# Patient Record
Sex: MALE | Race: WHITE
[De-identification: names, ages, dates, MRNs, and addresses within clinical notes are randomized per-mention and may not be internally consistent; named-entity substitution may affect disease eponyms.]

---

## 2018-02-16 ENCOUNTER — HOSPITAL ENCOUNTER (INPATIENT)
Dept: HOSPITAL 53 - M MSPAV | Age: 48
LOS: 8 days | Discharge: HOME | DRG: 193 | End: 2018-02-24
Attending: INTERNAL MEDICINE | Admitting: INTERNAL MEDICINE
Payer: COMMERCIAL

## 2018-02-16 DIAGNOSIS — Z79.82: ICD-10-CM

## 2018-02-16 DIAGNOSIS — J44.0: ICD-10-CM

## 2018-02-16 DIAGNOSIS — J44.1: ICD-10-CM

## 2018-02-16 DIAGNOSIS — J10.1: Primary | ICD-10-CM

## 2018-02-16 DIAGNOSIS — Z79.899: ICD-10-CM

## 2018-02-16 DIAGNOSIS — J96.01: ICD-10-CM

## 2018-02-16 DIAGNOSIS — F17.210: ICD-10-CM

## 2018-02-16 LAB
ABG BASE EXCESS: -0.7 (ref -2–2)
ABG BASE EXCESS: 0.5 (ref -2–2)
ABG HCO3: 24.6 MEQ/L (ref 22–26)
ABG HCO3: 25 MEQ/L (ref 22–26)
ABG O2 SATURATION: 92 % (ref 95–99)
ABG O2 SATURATION: 98.6 % (ref 95–99)
ABG PARTIAL PRESSURE CO2: 38.2 MMHG (ref 35–45)
ABG PARTIAL PRESSURE CO2: 44.9 MMHG (ref 35–45)
ABG PARTIAL PRESSURE O2: 130.5 MMHG (ref 75–100)
ABG PARTIAL PRESSURE O2: 66.1 MMHG (ref 75–100)
ABG PH (ARTERIAL): 7.36 UNITS (ref 7.35–7.45)
ABG PH (ARTERIAL): 7.43 UNITS (ref 7.35–7.45)
ABG STANDARD HCO3: 23.7 MEQ/L (ref 22–26)
ABG STANDARD HCO3: 24.9 MEQ/L (ref 22–26)
ABG TOTAL CO2: 25.7 MEQ/L (ref 22–29)
ABG TOTAL CO2: 26.4 MEQ/L (ref 22–29)
ALBUMIN/GLOBULIN RATIO: 0.86 (ref 1–1.93)
ALBUMIN: 3.6 GM/DL (ref 3.2–5.2)
ALKALINE PHOSPHATASE: 85 U/L (ref 45–117)
ALT SERPL W P-5'-P-CCNC: 19 U/L (ref 12–78)
ANION GAP: 7 MEQ/L (ref 8–16)
AST SERPL-CCNC: 13 U/L (ref 7–37)
BASO #: 0.1 10^3/UL (ref 0–0.2)
BASO %: 0.4 % (ref 0–1)
BILIRUB CONJ SERPL-MCNC: 0.2 MG/DL (ref 0–0.2)
BILIRUBIN,TOTAL: 0.5 MG/DL (ref 0.2–1)
BLOOD UREA NITROGEN: 10 MG/DL (ref 7–18)
CALCIUM LEVEL: 9 MG/DL (ref 8.5–10.1)
CARBON DIOXIDE LEVEL: 28 MEQ/L (ref 21–32)
CHLORIDE LEVEL: 102 MEQ/L (ref 98–107)
CK MB CFR.DF SERPL CALC: 2.87
CK MB CFR.DF SERPL CALC: 2.98
CK SERPL-CCNC: 66 U/L (ref 39–308)
CK SERPL-CCNC: 67 U/L (ref 39–308)
CK-MB VALUE MASS: 1.9 NG/ML (ref 0–3.6)
CK-MB VALUE MASS: 2 NG/ML (ref 0–3.6)
CREATININE FOR GFR: 0.81 MG/DL (ref 0.7–1.3)
EOS #: 0 10^3/UL (ref 0–0.5)
EOSINOPHIL NFR BLD AUTO: 0 % (ref 0–3)
GFR SERPL CREATININE-BSD FRML MDRD: > 60 ML/MIN/{1.73_M2} (ref 60–?)
GLUCOSE, FASTING: 138 MG/DL (ref 70–100)
HEMATOCRIT: 51.8 % (ref 42–52)
HEMOGLOBIN: 17.8 G/DL (ref 14–18)
IMMATURE GRANULOCYTE %: 0.4 % (ref 0–3)
LACTIC ACID SEPSIS PROTOCOL: 1.2 MMOL/L (ref 0.4–2)
LYMPH #: 1.2 10^3/UL (ref 1.5–4.5)
LYMPH %: 5.4 % (ref 24–44)
MAGNESIUM LEVEL: 2.2 MG/DL (ref 1.8–2.4)
MEAN CORPUSCULAR HEMOGLOBIN: 31.7 PG (ref 27–33)
MEAN CORPUSCULAR HGB CONC: 34.4 G/DL (ref 32–36.5)
MEAN CORPUSCULAR VOLUME: 92.3 FL (ref 80–96)
MONO #: 2.1 10^3/UL (ref 0–0.8)
MONO %: 9.4 % (ref 0–5)
NEUTROPHILS #: 18.9 10^3/UL (ref 1.8–7.7)
NEUTROPHILS %: 84.4 % (ref 36–66)
NRBC BLD AUTO-RTO: 0 % (ref 0–0)
NT-PRO BNP: 439 PG/ML (ref ?–125)
PLATELET COUNT, AUTOMATED: 315 10^3/UL (ref 150–450)
POSITIVE DIFF: (no result)
POTASSIUM SERUM: 4.5 MEQ/L (ref 3.5–5.1)
RED BLOOD COUNT: 5.61 10^6/UL (ref 4.3–6.1)
RED CELL DISTRIBUTION WIDTH: 13.1 % (ref 11.5–14.5)
SODIUM LEVEL: 137 MEQ/L (ref 136–145)
TOTAL PROTEIN: 7.8 GM/DL (ref 6.4–8.2)
TROPONIN I: < 0.02 NG/ML (ref ?–0.1)
TROPONIN I: < 0.02 NG/ML (ref ?–0.1)
WHITE BLOOD COUNT: 22.4 10^3/UL (ref 4–10)

## 2018-02-16 RX ADMIN — SODIUM CHLORIDE 1 MLS/HR: 9 INJECTION, SOLUTION INTRAVENOUS at 15:08

## 2018-02-16 RX ADMIN — SODIUM CHLORIDE 1 MLS/HR: 9 INJECTION, SOLUTION INTRAVENOUS at 23:53

## 2018-02-16 RX ADMIN — IPRATROPIUM BROMIDE AND ALBUTEROL SULFATE 1 ML: .5; 3 SOLUTION RESPIRATORY (INHALATION) at 20:02

## 2018-02-16 RX ADMIN — METHYLPREDNISOLONE SODIUM SUCCINATE 1 MG: 125 INJECTION, POWDER, FOR SOLUTION INTRAMUSCULAR; INTRAVENOUS at 06:00

## 2018-02-16 RX ADMIN — DEXTROSE MONOHYDRATE 1 MG: 50 INJECTION, SOLUTION INTRAVENOUS at 09:16

## 2018-02-16 RX ADMIN — ENOXAPARIN SODIUM 1 MG: 40 INJECTION SUBCUTANEOUS at 10:36

## 2018-02-16 RX ADMIN — CEFTRIAXONE SODIUM 1 MLS/HR: 100 INJECTION, POWDER, FOR SOLUTION INTRAVENOUS at 06:30

## 2018-02-16 RX ADMIN — METHYLPREDNISOLONE SODIUM SUCCINATE 1 MG: 125 INJECTION, POWDER, FOR SOLUTION INTRAMUSCULAR; INTRAVENOUS at 23:51

## 2018-02-16 RX ADMIN — SODIUM CHLORIDE 1 MLS/HR: 9 INJECTION, SOLUTION INTRAVENOUS at 09:56

## 2018-02-16 RX ADMIN — DEXTROSE MONOHYDRATE 1 MLS/HR: 50 INJECTION, SOLUTION INTRAVENOUS at 09:16

## 2018-02-16 RX ADMIN — LORAZEPAM 1 MG: 2 INJECTION INTRAMUSCULAR; INTRAVENOUS at 07:18

## 2018-02-16 RX ADMIN — ALBUTEROL SULFATE 1 MG: 2.5 SOLUTION RESPIRATORY (INHALATION) at 05:57

## 2018-02-16 RX ADMIN — NICOTINE 1 PATCH: 21 PATCH, EXTENDED RELEASE TRANSDERMAL at 09:00

## 2018-02-16 RX ADMIN — METHYLPREDNISOLONE SODIUM SUCCINATE 1 MG: 125 INJECTION, POWDER, FOR SOLUTION INTRAMUSCULAR; INTRAVENOUS at 12:17

## 2018-02-16 RX ADMIN — METHYLPREDNISOLONE SODIUM SUCCINATE 1 MG: 125 INJECTION, POWDER, FOR SOLUTION INTRAMUSCULAR; INTRAVENOUS at 18:02

## 2018-02-16 RX ADMIN — IPRATROPIUM BROMIDE AND ALBUTEROL SULFATE 1 ML: .5; 3 SOLUTION RESPIRATORY (INHALATION) at 08:00

## 2018-02-16 RX ADMIN — SODIUM CHLORIDE 1 MLS/HR: 9 INJECTION, SOLUTION INTRAVENOUS at 07:45

## 2018-02-17 LAB
ANION GAP: 6 MEQ/L (ref 8–16)
BLOOD UREA NITROGEN: 12 MG/DL (ref 7–18)
CALCIUM LEVEL: 8.6 MG/DL (ref 8.5–10.1)
CARBON DIOXIDE LEVEL: 30 MEQ/L (ref 21–32)
CHLORIDE LEVEL: 105 MEQ/L (ref 98–107)
CREATININE FOR GFR: 0.69 MG/DL (ref 0.7–1.3)
GFR SERPL CREATININE-BSD FRML MDRD: > 60 ML/MIN/{1.73_M2} (ref 60–?)
GLUCOSE, FASTING: 161 MG/DL (ref 70–100)
HEMATOCRIT: 46.3 % (ref 42–52)
HEMOGLOBIN: 15.8 G/DL (ref 14–18)
MEAN CORPUSCULAR HEMOGLOBIN: 31.6 PG (ref 27–33)
MEAN CORPUSCULAR HGB CONC: 34.1 G/DL (ref 32–36.5)
MEAN CORPUSCULAR VOLUME: 92.6 FL (ref 80–96)
NRBC BLD AUTO-RTO: 0 % (ref 0–0)
PLATELET COUNT, AUTOMATED: 317 10^3/UL (ref 150–450)
POTASSIUM SERUM: 4.7 MEQ/L (ref 3.5–5.1)
RED BLOOD COUNT: 5 10^6/UL (ref 4.3–6.1)
RED CELL DISTRIBUTION WIDTH: 13.3 % (ref 11.5–14.5)
SODIUM LEVEL: 141 MEQ/L (ref 136–145)
WHITE BLOOD COUNT: 17.4 10^3/UL (ref 4–10)

## 2018-02-17 RX ADMIN — METHYLPREDNISOLONE SODIUM SUCCINATE 1 MG: 125 INJECTION, POWDER, FOR SOLUTION INTRAMUSCULAR; INTRAVENOUS at 17:44

## 2018-02-17 RX ADMIN — DEXTROSE MONOHYDRATE 1 MLS/HR: 50 INJECTION, SOLUTION INTRAVENOUS at 07:36

## 2018-02-17 RX ADMIN — SODIUM CHLORIDE 1 MLS/HR: 9 INJECTION, SOLUTION INTRAVENOUS at 10:44

## 2018-02-17 RX ADMIN — NICOTINE 1 PATCH: 21 PATCH, EXTENDED RELEASE TRANSDERMAL at 09:00

## 2018-02-17 RX ADMIN — ENOXAPARIN SODIUM 1 MG: 40 INJECTION SUBCUTANEOUS at 08:40

## 2018-02-17 RX ADMIN — CEFTRIAXONE SODIUM 1 MLS/HR: 100 INJECTION, POWDER, FOR SOLUTION INTRAVENOUS at 06:12

## 2018-02-17 RX ADMIN — IPRATROPIUM BROMIDE AND ALBUTEROL SULFATE 1 ML: .5; 3 SOLUTION RESPIRATORY (INHALATION) at 20:00

## 2018-02-17 RX ADMIN — METHYLPREDNISOLONE SODIUM SUCCINATE 1 MG: 125 INJECTION, POWDER, FOR SOLUTION INTRAMUSCULAR; INTRAVENOUS at 06:12

## 2018-02-17 RX ADMIN — DEXTROSE MONOHYDRATE 1 MG: 50 INJECTION, SOLUTION INTRAVENOUS at 08:40

## 2018-02-17 RX ADMIN — IPRATROPIUM BROMIDE AND ALBUTEROL SULFATE 1 ML: .5; 3 SOLUTION RESPIRATORY (INHALATION) at 11:45

## 2018-02-17 RX ADMIN — METHYLPREDNISOLONE SODIUM SUCCINATE 1 MG: 125 INJECTION, POWDER, FOR SOLUTION INTRAMUSCULAR; INTRAVENOUS at 11:35

## 2018-02-17 RX ADMIN — IPRATROPIUM BROMIDE AND ALBUTEROL SULFATE 1 ML: .5; 3 SOLUTION RESPIRATORY (INHALATION) at 15:17

## 2018-02-17 RX ADMIN — IPRATROPIUM BROMIDE AND ALBUTEROL SULFATE 1 ML: .5; 3 SOLUTION RESPIRATORY (INHALATION) at 08:49

## 2018-02-18 LAB
ANION GAP: 3 MEQ/L (ref 8–16)
BLOOD UREA NITROGEN: 13 MG/DL (ref 7–18)
CALCIUM LEVEL: 8.5 MG/DL (ref 8.5–10.1)
CARBON DIOXIDE LEVEL: 33 MEQ/L (ref 21–32)
CHLORIDE LEVEL: 104 MEQ/L (ref 98–107)
CREATININE FOR GFR: 0.7 MG/DL (ref 0.7–1.3)
GFR SERPL CREATININE-BSD FRML MDRD: > 60 ML/MIN/{1.73_M2} (ref 60–?)
GLUCOSE, FASTING: 136 MG/DL (ref 70–100)
HEMATOCRIT: 45.1 % (ref 42–52)
HEMOGLOBIN: 14.9 G/DL (ref 14–18)
MEAN CORPUSCULAR HEMOGLOBIN: 31.5 PG (ref 27–33)
MEAN CORPUSCULAR HGB CONC: 33 G/DL (ref 32–36.5)
MEAN CORPUSCULAR VOLUME: 95.3 FL (ref 80–96)
NRBC BLD AUTO-RTO: 0 % (ref 0–0)
PLATELET COUNT, AUTOMATED: 342 10^3/UL (ref 150–450)
POTASSIUM SERUM: 4.4 MEQ/L (ref 3.5–5.1)
RED BLOOD COUNT: 4.73 10^6/UL (ref 4.3–6.1)
RED CELL DISTRIBUTION WIDTH: 13.2 % (ref 11.5–14.5)
SODIUM LEVEL: 140 MEQ/L (ref 136–145)
WHITE BLOOD COUNT: 24.7 10^3/UL (ref 4–10)

## 2018-02-18 RX ADMIN — DEXTROSE MONOHYDRATE 1 MLS/HR: 50 INJECTION, SOLUTION INTRAVENOUS at 07:55

## 2018-02-18 RX ADMIN — ENOXAPARIN SODIUM 1 MG: 40 INJECTION SUBCUTANEOUS at 07:55

## 2018-02-18 RX ADMIN — METHYLPREDNISOLONE SODIUM SUCCINATE 1 MG: 125 INJECTION, POWDER, FOR SOLUTION INTRAMUSCULAR; INTRAVENOUS at 12:10

## 2018-02-18 RX ADMIN — METHYLPREDNISOLONE SODIUM SUCCINATE 1 MG: 125 INJECTION, POWDER, FOR SOLUTION INTRAMUSCULAR; INTRAVENOUS at 17:31

## 2018-02-18 RX ADMIN — METHYLPREDNISOLONE SODIUM SUCCINATE 1 MG: 125 INJECTION, POWDER, FOR SOLUTION INTRAMUSCULAR; INTRAVENOUS at 06:37

## 2018-02-18 RX ADMIN — NICOTINE 1 PATCH: 21 PATCH, EXTENDED RELEASE TRANSDERMAL at 07:56

## 2018-02-18 RX ADMIN — IPRATROPIUM BROMIDE AND ALBUTEROL SULFATE 1 ML: .5; 3 SOLUTION RESPIRATORY (INHALATION) at 08:07

## 2018-02-18 RX ADMIN — IPRATROPIUM BROMIDE AND ALBUTEROL SULFATE 1 ML: .5; 3 SOLUTION RESPIRATORY (INHALATION) at 20:08

## 2018-02-18 RX ADMIN — CEFTRIAXONE SODIUM 1 MLS/HR: 100 INJECTION, POWDER, FOR SOLUTION INTRAVENOUS at 06:37

## 2018-02-18 RX ADMIN — METHYLPREDNISOLONE SODIUM SUCCINATE 1 MG: 125 INJECTION, POWDER, FOR SOLUTION INTRAMUSCULAR; INTRAVENOUS at 23:53

## 2018-02-18 RX ADMIN — METHYLPREDNISOLONE SODIUM SUCCINATE 1 MG: 125 INJECTION, POWDER, FOR SOLUTION INTRAMUSCULAR; INTRAVENOUS at 00:20

## 2018-02-18 RX ADMIN — IPRATROPIUM BROMIDE AND ALBUTEROL SULFATE 1 ML: .5; 3 SOLUTION RESPIRATORY (INHALATION) at 12:02

## 2018-02-19 LAB
ANION GAP: 5 MEQ/L (ref 8–16)
BLOOD UREA NITROGEN: 17 MG/DL (ref 7–18)
CALCIUM LEVEL: 8.5 MG/DL (ref 8.5–10.1)
CARBON DIOXIDE LEVEL: 34 MEQ/L (ref 21–32)
CHLORIDE LEVEL: 104 MEQ/L (ref 98–107)
CREATININE FOR GFR: 0.74 MG/DL (ref 0.7–1.3)
GFR SERPL CREATININE-BSD FRML MDRD: > 60 ML/MIN/{1.73_M2} (ref 60–?)
GLUCOSE, FASTING: 133 MG/DL (ref 70–100)
HEMATOCRIT: 43.5 % (ref 42–52)
HEMOGLOBIN: 14.5 G/DL (ref 14–18)
MEAN CORPUSCULAR HEMOGLOBIN: 31.8 PG (ref 27–33)
MEAN CORPUSCULAR HGB CONC: 33.3 G/DL (ref 32–36.5)
MEAN CORPUSCULAR VOLUME: 95.4 FL (ref 80–96)
NRBC BLD AUTO-RTO: 0 % (ref 0–0)
PLATELET COUNT, AUTOMATED: 341 10^3/UL (ref 150–450)
POTASSIUM SERUM: 4.1 MEQ/L (ref 3.5–5.1)
RED BLOOD COUNT: 4.56 10^6/UL (ref 4.3–6.1)
RED CELL DISTRIBUTION WIDTH: 13.2 % (ref 11.5–14.5)
SODIUM LEVEL: 143 MEQ/L (ref 136–145)
WHITE BLOOD COUNT: 20 10^3/UL (ref 4–10)

## 2018-02-19 RX ADMIN — IPRATROPIUM BROMIDE AND ALBUTEROL SULFATE 1 ML: .5; 3 SOLUTION RESPIRATORY (INHALATION) at 07:24

## 2018-02-19 RX ADMIN — ENOXAPARIN SODIUM 1 MG: 40 INJECTION SUBCUTANEOUS at 08:42

## 2018-02-19 RX ADMIN — CEFTRIAXONE SODIUM 1 MLS/HR: 100 INJECTION, POWDER, FOR SOLUTION INTRAVENOUS at 06:06

## 2018-02-19 RX ADMIN — IPRATROPIUM BROMIDE AND ALBUTEROL SULFATE 1 ML: .5; 3 SOLUTION RESPIRATORY (INHALATION) at 20:59

## 2018-02-19 RX ADMIN — METHYLPREDNISOLONE SODIUM SUCCINATE 1 MG: 125 INJECTION, POWDER, FOR SOLUTION INTRAMUSCULAR; INTRAVENOUS at 06:06

## 2018-02-19 RX ADMIN — METHYLPREDNISOLONE SODIUM SUCCINATE 1 MG: 125 INJECTION, POWDER, FOR SOLUTION INTRAMUSCULAR; INTRAVENOUS at 12:52

## 2018-02-19 RX ADMIN — IPRATROPIUM BROMIDE AND ALBUTEROL SULFATE 1 ML: .5; 3 SOLUTION RESPIRATORY (INHALATION) at 15:46

## 2018-02-19 RX ADMIN — METHYLPREDNISOLONE SODIUM SUCCINATE 1 MG: 125 INJECTION, POWDER, FOR SOLUTION INTRAMUSCULAR; INTRAVENOUS at 17:12

## 2018-02-19 RX ADMIN — IPRATROPIUM BROMIDE AND ALBUTEROL SULFATE 1 ML: .5; 3 SOLUTION RESPIRATORY (INHALATION) at 11:20

## 2018-02-19 RX ADMIN — NICOTINE 1 PATCH: 21 PATCH, EXTENDED RELEASE TRANSDERMAL at 08:42

## 2018-02-20 LAB
A1AT SERPL-MCNC: 109 MG/DL (ref 90–200)
ANION GAP: 4 MEQ/L (ref 8–16)
BLOOD UREA NITROGEN: 16 MG/DL (ref 7–18)
CALCIUM LEVEL: 8.9 MG/DL (ref 8.5–10.1)
CARBON DIOXIDE LEVEL: 35 MEQ/L (ref 21–32)
CHLORIDE LEVEL: 100 MEQ/L (ref 98–107)
CREATININE FOR GFR: 0.59 MG/DL (ref 0.7–1.3)
GFR SERPL CREATININE-BSD FRML MDRD: > 60 ML/MIN/{1.73_M2} (ref 60–?)
GLUCOSE, FASTING: 122 MG/DL (ref 70–100)
HEMATOCRIT: 43.5 % (ref 42–52)
HEMOGLOBIN: 14.6 G/DL (ref 14–18)
MEAN CORPUSCULAR HEMOGLOBIN: 31.7 PG (ref 27–33)
MEAN CORPUSCULAR HGB CONC: 33.6 G/DL (ref 32–36.5)
MEAN CORPUSCULAR VOLUME: 94.4 FL (ref 80–96)
NRBC BLD AUTO-RTO: 0 % (ref 0–0)
PLATELET COUNT, AUTOMATED: 343 10^3/UL (ref 150–450)
POTASSIUM SERUM: 4.4 MEQ/L (ref 3.5–5.1)
RED BLOOD COUNT: 4.61 10^6/UL (ref 4.3–6.1)
RED CELL DISTRIBUTION WIDTH: 12.9 % (ref 11.5–14.5)
SODIUM LEVEL: 139 MEQ/L (ref 136–145)
WHITE BLOOD COUNT: 16.1 10^3/UL (ref 4–10)

## 2018-02-20 RX ADMIN — METHYLPREDNISOLONE SODIUM SUCCINATE 1 MG: 125 INJECTION, POWDER, FOR SOLUTION INTRAMUSCULAR; INTRAVENOUS at 23:21

## 2018-02-20 RX ADMIN — IPRATROPIUM BROMIDE AND ALBUTEROL SULFATE 1 ML: .5; 3 SOLUTION RESPIRATORY (INHALATION) at 07:42

## 2018-02-20 RX ADMIN — IPRATROPIUM BROMIDE AND ALBUTEROL SULFATE 1 ML: .5; 3 SOLUTION RESPIRATORY (INHALATION) at 11:26

## 2018-02-20 RX ADMIN — METHYLPREDNISOLONE SODIUM SUCCINATE 1 MG: 125 INJECTION, POWDER, FOR SOLUTION INTRAMUSCULAR; INTRAVENOUS at 06:34

## 2018-02-20 RX ADMIN — CEFTRIAXONE SODIUM 1 MLS/HR: 100 INJECTION, POWDER, FOR SOLUTION INTRAVENOUS at 06:34

## 2018-02-20 RX ADMIN — METHYLPREDNISOLONE SODIUM SUCCINATE 1 MG: 125 INJECTION, POWDER, FOR SOLUTION INTRAMUSCULAR; INTRAVENOUS at 12:06

## 2018-02-20 RX ADMIN — NICOTINE 1 PATCH: 21 PATCH, EXTENDED RELEASE TRANSDERMAL at 09:37

## 2018-02-20 RX ADMIN — METHYLPREDNISOLONE SODIUM SUCCINATE 1 MG: 125 INJECTION, POWDER, FOR SOLUTION INTRAMUSCULAR; INTRAVENOUS at 00:30

## 2018-02-20 RX ADMIN — METHYLPREDNISOLONE SODIUM SUCCINATE 1 MG: 125 INJECTION, POWDER, FOR SOLUTION INTRAMUSCULAR; INTRAVENOUS at 17:45

## 2018-02-20 RX ADMIN — IPRATROPIUM BROMIDE AND ALBUTEROL SULFATE 1 ML: .5; 3 SOLUTION RESPIRATORY (INHALATION) at 14:48

## 2018-02-20 RX ADMIN — ENOXAPARIN SODIUM 1 MG: 40 INJECTION SUBCUTANEOUS at 09:35

## 2018-02-21 LAB
ANION GAP: 4 MEQ/L (ref 8–16)
BLOOD UREA NITROGEN: 17 MG/DL (ref 7–18)
CALCIUM LEVEL: 8.7 MG/DL (ref 8.5–10.1)
CARBON DIOXIDE LEVEL: 33 MEQ/L (ref 21–32)
CHLORIDE LEVEL: 99 MEQ/L (ref 98–107)
CREATININE FOR GFR: 0.64 MG/DL (ref 0.7–1.3)
GFR SERPL CREATININE-BSD FRML MDRD: > 60 ML/MIN/{1.73_M2} (ref 60–?)
GLUCOSE, FASTING: 112 MG/DL (ref 70–100)
HEMATOCRIT: 44.4 % (ref 42–52)
HEMOGLOBIN: 15.2 G/DL (ref 14–18)
MEAN CORPUSCULAR HEMOGLOBIN: 31.7 PG (ref 27–33)
MEAN CORPUSCULAR HGB CONC: 34.2 G/DL (ref 32–36.5)
MEAN CORPUSCULAR VOLUME: 92.7 FL (ref 80–96)
NRBC BLD AUTO-RTO: 0 % (ref 0–0)
PLATELET COUNT, AUTOMATED: 338 10^3/UL (ref 150–450)
POTASSIUM SERUM: 4.2 MEQ/L (ref 3.5–5.1)
RED BLOOD COUNT: 4.79 10^6/UL (ref 4.3–6.1)
RED CELL DISTRIBUTION WIDTH: 12.8 % (ref 11.5–14.5)
SODIUM LEVEL: 136 MEQ/L (ref 136–145)
WHITE BLOOD COUNT: 12.7 10^3/UL (ref 4–10)

## 2018-02-21 RX ADMIN — IPRATROPIUM BROMIDE AND ALBUTEROL SULFATE 1 ML: .5; 3 SOLUTION RESPIRATORY (INHALATION) at 19:53

## 2018-02-21 RX ADMIN — SODIUM CHLORIDE, PRESERVATIVE FREE 1 ML: 5 INJECTION INTRAVENOUS at 23:56

## 2018-02-21 RX ADMIN — METHYLPREDNISOLONE SODIUM SUCCINATE 1 MG: 125 INJECTION, POWDER, FOR SOLUTION INTRAMUSCULAR; INTRAVENOUS at 11:08

## 2018-02-21 RX ADMIN — IPRATROPIUM BROMIDE AND ALBUTEROL SULFATE 1 ML: .5; 3 SOLUTION RESPIRATORY (INHALATION) at 07:45

## 2018-02-21 RX ADMIN — IPRATROPIUM BROMIDE AND ALBUTEROL SULFATE 1 ML: .5; 3 SOLUTION RESPIRATORY (INHALATION) at 15:15

## 2018-02-21 RX ADMIN — IPRATROPIUM BROMIDE AND ALBUTEROL SULFATE 1 ML: .5; 3 SOLUTION RESPIRATORY (INHALATION) at 11:12

## 2018-02-21 RX ADMIN — METHYLPREDNISOLONE SODIUM SUCCINATE 1 MG: 125 INJECTION, POWDER, FOR SOLUTION INTRAMUSCULAR; INTRAVENOUS at 06:03

## 2018-02-21 RX ADMIN — METHYLPREDNISOLONE SODIUM SUCCINATE 1 MG: 125 INJECTION, POWDER, FOR SOLUTION INTRAMUSCULAR; INTRAVENOUS at 23:56

## 2018-02-21 RX ADMIN — IPRATROPIUM BROMIDE AND ALBUTEROL SULFATE 1 ML: .5; 3 SOLUTION RESPIRATORY (INHALATION) at 00:07

## 2018-02-21 RX ADMIN — CEFTRIAXONE SODIUM 1 MLS/HR: 100 INJECTION, POWDER, FOR SOLUTION INTRAVENOUS at 06:04

## 2018-02-21 RX ADMIN — METHYLPREDNISOLONE SODIUM SUCCINATE 1 MG: 125 INJECTION, POWDER, FOR SOLUTION INTRAMUSCULAR; INTRAVENOUS at 17:20

## 2018-02-21 RX ADMIN — NICOTINE 1 PATCH: 21 PATCH, EXTENDED RELEASE TRANSDERMAL at 09:00

## 2018-02-21 RX ADMIN — ENOXAPARIN SODIUM 1 MG: 40 INJECTION SUBCUTANEOUS at 09:25

## 2018-02-22 LAB
A1AT SERPL-MCNC: 108 MG/DL (ref 90–200)
ANION GAP: 4 MEQ/L (ref 8–16)
BLOOD UREA NITROGEN: 20 MG/DL (ref 7–18)
CALCIUM LEVEL: 8.8 MG/DL (ref 8.5–10.1)
CARBON DIOXIDE LEVEL: 34 MEQ/L (ref 21–32)
CHLORIDE LEVEL: 102 MEQ/L (ref 98–107)
CREATININE FOR GFR: 0.81 MG/DL (ref 0.7–1.3)
GFR SERPL CREATININE-BSD FRML MDRD: > 60 ML/MIN/{1.73_M2} (ref 60–?)
GLUCOSE, FASTING: 120 MG/DL (ref 70–100)
HEMATOCRIT: 45.4 % (ref 42–52)
HEMOGLOBIN: 15.4 G/DL (ref 14–18)
MEAN CORPUSCULAR HEMOGLOBIN: 31.7 PG (ref 27–33)
MEAN CORPUSCULAR HGB CONC: 33.9 G/DL (ref 32–36.5)
MEAN CORPUSCULAR VOLUME: 93.4 FL (ref 80–96)
NRBC BLD AUTO-RTO: 0 % (ref 0–0)
PLATELET COUNT, AUTOMATED: 359 10^3/UL (ref 150–450)
POTASSIUM SERUM: 4.3 MEQ/L (ref 3.5–5.1)
RED BLOOD COUNT: 4.86 10^6/UL (ref 4.3–6.1)
RED CELL DISTRIBUTION WIDTH: 12.8 % (ref 11.5–14.5)
SERPINA1 GENE MUT ANL BLD/T: (no result)
SODIUM LEVEL: 140 MEQ/L (ref 136–145)
WHITE BLOOD COUNT: 18.6 10^3/UL (ref 4–10)

## 2018-02-22 RX ADMIN — SODIUM CHLORIDE, PRESERVATIVE FREE 1 ML: 5 INJECTION INTRAVENOUS at 21:19

## 2018-02-22 RX ADMIN — IPRATROPIUM BROMIDE AND ALBUTEROL SULFATE 1 ML: .5; 3 SOLUTION RESPIRATORY (INHALATION) at 20:00

## 2018-02-22 RX ADMIN — ENOXAPARIN SODIUM 1 MG: 40 INJECTION SUBCUTANEOUS at 08:48

## 2018-02-22 RX ADMIN — IPRATROPIUM BROMIDE AND ALBUTEROL SULFATE 1 ML: .5; 3 SOLUTION RESPIRATORY (INHALATION) at 15:04

## 2018-02-22 RX ADMIN — SODIUM CHLORIDE, PRESERVATIVE FREE 1 ML: 5 INJECTION INTRAVENOUS at 13:01

## 2018-02-22 RX ADMIN — SODIUM CHLORIDE, PRESERVATIVE FREE 1 ML: 5 INJECTION INTRAVENOUS at 06:45

## 2018-02-22 RX ADMIN — METHYLPREDNISOLONE SODIUM SUCCINATE 1 MG: 125 INJECTION, POWDER, FOR SOLUTION INTRAMUSCULAR; INTRAVENOUS at 06:44

## 2018-02-22 RX ADMIN — IPRATROPIUM BROMIDE AND ALBUTEROL SULFATE 1 ML: .5; 3 SOLUTION RESPIRATORY (INHALATION) at 07:32

## 2018-02-22 RX ADMIN — METHYLPREDNISOLONE SODIUM SUCCINATE 1 MG: 125 INJECTION, POWDER, FOR SOLUTION INTRAMUSCULAR; INTRAVENOUS at 18:20

## 2018-02-22 RX ADMIN — IPRATROPIUM BROMIDE AND ALBUTEROL SULFATE 1 ML: .5; 3 SOLUTION RESPIRATORY (INHALATION) at 11:07

## 2018-02-22 RX ADMIN — NICOTINE 1 PATCH: 21 PATCH, EXTENDED RELEASE TRANSDERMAL at 08:48

## 2018-02-22 RX ADMIN — CEFTRIAXONE SODIUM 1 MLS/HR: 100 INJECTION, POWDER, FOR SOLUTION INTRAVENOUS at 06:44

## 2018-02-22 RX ADMIN — SODIUM CHLORIDE, PRESERVATIVE FREE 1 ML: 5 INJECTION INTRAVENOUS at 18:21

## 2018-02-23 LAB
ANION GAP: 6 MEQ/L (ref 8–16)
BLOOD UREA NITROGEN: 17 MG/DL (ref 7–18)
CALCIUM LEVEL: 8.2 MG/DL (ref 8.5–10.1)
CARBON DIOXIDE LEVEL: 31 MEQ/L (ref 21–32)
CHLORIDE LEVEL: 101 MEQ/L (ref 98–107)
CREATININE FOR GFR: 0.62 MG/DL (ref 0.7–1.3)
GFR SERPL CREATININE-BSD FRML MDRD: > 60 ML/MIN/{1.73_M2} (ref 60–?)
GLUCOSE, FASTING: 83 MG/DL (ref 70–100)
HEMATOCRIT: 44.1 % (ref 42–52)
HEMOGLOBIN: 15.3 G/DL (ref 14–18)
MEAN CORPUSCULAR HEMOGLOBIN: 32.1 PG (ref 27–33)
MEAN CORPUSCULAR HGB CONC: 34.7 G/DL (ref 32–36.5)
MEAN CORPUSCULAR VOLUME: 92.6 FL (ref 80–96)
NRBC BLD AUTO-RTO: 0 % (ref 0–0)
PLATELET COUNT, AUTOMATED: 352 10^3/UL (ref 150–450)
POTASSIUM SERUM: 4.1 MEQ/L (ref 3.5–5.1)
RED BLOOD COUNT: 4.76 10^6/UL (ref 4.3–6.1)
RED CELL DISTRIBUTION WIDTH: 12.9 % (ref 11.5–14.5)
SODIUM LEVEL: 138 MEQ/L (ref 136–145)
WHITE BLOOD COUNT: 14.8 10^3/UL (ref 4–10)

## 2018-02-23 RX ADMIN — SODIUM CHLORIDE, PRESERVATIVE FREE 1 ML: 5 INJECTION INTRAVENOUS at 05:49

## 2018-02-23 RX ADMIN — METHYLPREDNISOLONE SODIUM SUCCINATE 1 MG: 125 INJECTION, POWDER, FOR SOLUTION INTRAMUSCULAR; INTRAVENOUS at 18:04

## 2018-02-23 RX ADMIN — SODIUM CHLORIDE, PRESERVATIVE FREE 1 ML: 5 INJECTION INTRAVENOUS at 20:39

## 2018-02-23 RX ADMIN — SODIUM CHLORIDE, PRESERVATIVE FREE 1 ML: 5 INJECTION INTRAVENOUS at 18:04

## 2018-02-23 RX ADMIN — CEFTRIAXONE SODIUM 1 MLS/HR: 100 INJECTION, POWDER, FOR SOLUTION INTRAVENOUS at 06:45

## 2018-02-23 RX ADMIN — IPRATROPIUM BROMIDE AND ALBUTEROL SULFATE 1 ML: .5; 3 SOLUTION RESPIRATORY (INHALATION) at 08:13

## 2018-02-23 RX ADMIN — NICOTINE 1 PATCH: 21 PATCH, EXTENDED RELEASE TRANSDERMAL at 08:26

## 2018-02-23 RX ADMIN — METHYLPREDNISOLONE SODIUM SUCCINATE 1 MG: 125 INJECTION, POWDER, FOR SOLUTION INTRAMUSCULAR; INTRAVENOUS at 05:48

## 2018-02-23 RX ADMIN — SODIUM CHLORIDE, PRESERVATIVE FREE 1 ML: 5 INJECTION INTRAVENOUS at 15:56

## 2018-02-23 RX ADMIN — ENOXAPARIN SODIUM 1 MG: 40 INJECTION SUBCUTANEOUS at 08:26

## 2018-02-24 RX ADMIN — CEFTRIAXONE SODIUM 1 MLS/HR: 100 INJECTION, POWDER, FOR SOLUTION INTRAVENOUS at 06:06

## 2018-02-24 RX ADMIN — SODIUM CHLORIDE, PRESERVATIVE FREE 1 ML: 5 INJECTION INTRAVENOUS at 06:06

## 2019-02-15 ENCOUNTER — HOSPITAL ENCOUNTER (INPATIENT)
Dept: HOSPITAL 53 - M ED | Age: 49
LOS: 3 days | Discharge: HOME | DRG: 278 | End: 2019-02-18
Attending: THORACIC SURGERY (CARDIOTHORACIC VASCULAR SURGERY) | Admitting: THORACIC SURGERY (CARDIOTHORACIC VASCULAR SURGERY)
Payer: COMMERCIAL

## 2019-02-15 VITALS — DIASTOLIC BLOOD PRESSURE: 72 MMHG | SYSTOLIC BLOOD PRESSURE: 110 MMHG

## 2019-02-15 VITALS — HEIGHT: 66 IN | BODY MASS INDEX: 23.95 KG/M2 | WEIGHT: 149.03 LBS

## 2019-02-15 VITALS — SYSTOLIC BLOOD PRESSURE: 102 MMHG | DIASTOLIC BLOOD PRESSURE: 69 MMHG

## 2019-02-15 DIAGNOSIS — F17.200: ICD-10-CM

## 2019-02-15 DIAGNOSIS — Z79.899: ICD-10-CM

## 2019-02-15 DIAGNOSIS — J44.9: ICD-10-CM

## 2019-02-15 DIAGNOSIS — S21.91XA: ICD-10-CM
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
## 2019-02-15 DIAGNOSIS — Z79.82: ICD-10-CM

## 2019-02-15 DIAGNOSIS — S26.09XA: Primary | ICD-10-CM
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
## 2019-02-15 LAB
ALBUMIN SERPL BCG-MCNC: 3.6 GM/DL (ref 3.2–5.2)
ALT SERPL W P-5'-P-CCNC: 18 U/L (ref 12–78)
AMPHETAMINES UR QL SCN: NEGATIVE
AMYLASE SERPL-CCNC: 37 U/L (ref 25–115)
APTT BLD: 22.5 SECONDS (ref 25.4–37.6)
BARBITURATES UR QL SCN: NEGATIVE
BASE EXCESS BLDA CALC-SCNC: -7 MMOL/L (ref -2–2)
BASOPHILS # BLD AUTO: 0.1 10^3/UL (ref 0–0.2)
BASOPHILS NFR BLD AUTO: 0.5 % (ref 0–1)
BENZODIAZ UR QL SCN: NEGATIVE
BILIRUB CONJ SERPL-MCNC: 0.2 MG/DL (ref 0–0.2)
BILIRUB SERPL-MCNC: 0.7 MG/DL (ref 0.2–1)
BUN SERPL-MCNC: 8 MG/DL (ref 7–18)
BUN SERPL-MCNC: 9 MG/DL (ref 7–18)
BZE UR QL SCN: NEGATIVE
CALCIUM SERPL-MCNC: 7.5 MG/DL (ref 8.5–10.1)
CALCIUM SERPL-MCNC: 7.6 MG/DL (ref 8.5–10.1)
CANNABINOIDS UR QL SCN: NEGATIVE
CHLORIDE SERPL-SCNC: 107 MEQ/L (ref 98–107)
CHLORIDE SERPL-SCNC: 108 MEQ/L (ref 98–107)
CK MB CFR.DF SERPL CALC: 1.4
CK MB SERPL-MCNC: 1 NG/ML (ref ?–3.6)
CK SERPL-CCNC: 100 U/L (ref 39–308)
CO2 BLDA CALC-SCNC: 20.8 MEQ/L (ref 22–29)
CO2 SERPL-SCNC: 20 MEQ/L (ref 21–32)
CO2 SERPL-SCNC: 22 MEQ/L (ref 21–32)
CREAT SERPL-MCNC: 0.88 MG/DL (ref 0.7–1.3)
CREAT SERPL-MCNC: 0.9 MG/DL (ref 0.7–1.3)
EOSINOPHIL # BLD AUTO: 0.2 10^3/UL (ref 0–0.5)
EOSINOPHIL NFR BLD AUTO: 1.1 % (ref 0–3)
EOSINOPHIL NFR BLD MANUAL: 3 % (ref 0–5)
ETHANOL SERPL-MCNC: 0.04 % (ref 0–0.01)
GFR SERPL CREATININE-BSD FRML MDRD: > 60 ML/MIN/{1.73_M2} (ref 60–?)
GFR SERPL CREATININE-BSD FRML MDRD: > 60 ML/MIN/{1.73_M2} (ref 60–?)
GLUCOSE SERPL-MCNC: 117 MG/DL (ref 70–100)
GLUCOSE SERPL-MCNC: 92 MG/DL (ref 70–100)
HCO3 BLDA-SCNC: 19.5 MEQ/L (ref 22–26)
HCO3 STD BLDA-SCNC: 18.7 MEQ/L (ref 22–26)
HCT VFR BLD AUTO: 39 % (ref 42–52)
HCT VFR BLD AUTO: 45.3 % (ref 42–52)
HGB BLD-MCNC: 13.1 G/DL (ref 13.5–17.5)
HGB BLD-MCNC: 15.6 G/DL (ref 13.5–17.5)
INR PPP: 0.94
LIPASE SERPL-CCNC: 156 U/L (ref 73–393)
LYMPHOCYTES # BLD AUTO: 1.8 10^3/UL (ref 1.5–4.5)
LYMPHOCYTES NFR BLD AUTO: 12.5 % (ref 24–44)
LYMPHOCYTES NFR BLD MANUAL: 47 % (ref 16–52)
MCH RBC QN AUTO: 32.8 PG (ref 27–33)
MCH RBC QN AUTO: 32.9 PG (ref 27–33)
MCHC RBC AUTO-ENTMCNC: 33.6 G/DL (ref 32–36.5)
MCHC RBC AUTO-ENTMCNC: 34.4 G/DL (ref 32–36.5)
MCV RBC AUTO: 95.6 FL (ref 80–96)
MCV RBC AUTO: 97.5 FL (ref 80–96)
METHADONE UR QL SCN: NEGATIVE
MONOCYTES # BLD AUTO: 0.7 10^3/UL (ref 0–0.8)
MONOCYTES NFR BLD AUTO: 4.7 % (ref 0–5)
MONOCYTES NFR BLD MANUAL: 3 % (ref 0–8)
NEUTROPHILS # BLD AUTO: 11.7 10^3/UL (ref 1.8–7.7)
NEUTROPHILS NFR BLD AUTO: 80.3 % (ref 36–66)
NEUTROPHILS NFR BLD MANUAL: 41 % (ref 35–75)
OPIATES UR QL SCN: NEGATIVE
PCO2 BLDA: 42.7 MMHG (ref 35–45)
PCP UR QL SCN: NEGATIVE
PH BLDA: 7.28 UNITS (ref 7.35–7.45)
PLATELET # BLD AUTO: 185 10^3/UL (ref 150–450)
PLATELET # BLD AUTO: 214 10^3/UL (ref 150–450)
PLATELET BLD QL SMEAR: NORMAL
PO2 BLDA: 75.8 MMHG (ref 75–100)
POTASSIUM SERPL-SCNC: 3.2 MEQ/L (ref 3.5–5.1)
POTASSIUM SERPL-SCNC: 4 MEQ/L (ref 3.5–5.1)
PROT SERPL-MCNC: 5.5 GM/DL (ref 6.4–8.2)
PROTHROMBIN TIME: 12.7 SECONDS (ref 12.1–14.4)
RBC # BLD AUTO: 4 10^6/UL (ref 4.3–6.1)
RBC # BLD AUTO: 4.74 10^6/UL (ref 4.3–6.1)
SAO2 % BLDA: 93.8 % (ref 95–99)
SODIUM SERPL-SCNC: 136 MEQ/L (ref 136–145)
SODIUM SERPL-SCNC: 138 MEQ/L (ref 136–145)
TROPONIN I SERPL-MCNC: 0.02 NG/ML (ref ?–0.1)
VARIANT LYMPHS NFR BLD MANUAL: 6 % (ref 0–5)
WBC # BLD AUTO: 12.9 10^3/UL (ref 4–10)
WBC # BLD AUTO: 14.5 10^3/UL (ref 4–10)

## 2019-02-15 PROCEDURE — 02CN0ZZ EXTIRPATION OF MATTER FROM PERICARDIUM, OPEN APPROACH: ICD-10-PCS | Performed by: THORACIC SURGERY (CARDIOTHORACIC VASCULAR SURGERY)

## 2019-02-15 RX ADMIN — FENTANYL CITRATE PRN MCG: 50 INJECTION, SOLUTION INTRAMUSCULAR; INTRAVENOUS at 21:32

## 2019-02-15 RX ADMIN — FENTANYL CITRATE PRN MCG: 50 INJECTION, SOLUTION INTRAMUSCULAR; INTRAVENOUS at 21:20

## 2019-02-15 RX ADMIN — FENTANYL CITRATE PRN MCG: 50 INJECTION, SOLUTION INTRAMUSCULAR; INTRAVENOUS at 21:37

## 2019-02-15 RX ADMIN — DOCUSATE SODIUM SCH MG: 100 CAPSULE, LIQUID FILLED ORAL at 23:13

## 2019-02-15 RX ADMIN — POTASSIUM CHLORIDE, DEXTROSE MONOHYDRATE AND SODIUM CHLORIDE SCH MLS/HR: 150; 5; 900 INJECTION, SOLUTION INTRAVENOUS at 23:01

## 2019-02-15 RX ADMIN — SODIUM CHLORIDE SCH UNITS: 4.5 INJECTION, SOLUTION INTRAVENOUS at 23:13

## 2019-02-15 RX ADMIN — FENTANYL CITRATE PRN MCG: 50 INJECTION, SOLUTION INTRAMUSCULAR; INTRAVENOUS at 21:26
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
## 2019-02-15 NOTE — ECHO
DATE OF PROCEDURE:  02/15/2019

 

REFERRING PHYSICIAN:  Dr. Selvin Hand

INDICATION:  Chest pain, unspecified.

 

HEIGHT:  167 cm

WEIGHT:  65.9 kg

 

2D MEASUREMENTS:

LVOT:  2.2 cm

Aortic root:  3.6 cm

Left atrium:  2.7 cm

Ventricular septum:  0.98 cm

Posterior wall:  0.92 cm

Left ventricle diastole:  4.6 cm

 

DOPPLER MEASUREMENTS:

No aortic regurgitation.

No mitral regurgitation.

Mitral E velocity:  59.7 cm/s

Mitral A velocity:  54.3 cm/s

Mitral deceleration time:  239 milliseconds

No tricuspid regurgitation.

 

MITRAL ANNULAR TISSUE DOPPLER:

E prime septal:  7.8 cm/s

 

DESCRIPTION: Rhythm was sinus. Image quality was fair. This was a 2D, M-mode,

color flow Doppler and pulse wave Doppler examination and included mitral annular

tissue Doppler. Image quality was fair.

 

CONCLUSIONS:

Small pericardial effusion localized over the anterior wall and the right lateral

free wall of the right ventricle. No diastolic chamber collapse. No significant

variation of intracardiac velocities. Left ventricle is normal in size, wall

thickness, regional wall motion, and wall thickening. Normal left ventricular

(LV) systolic function. Left ventricular ejection fraction (LVEF) 60% by visual

estimate. LV diastolic function within normal limits. Right ventricle is normal

in size and systolic function. Atria appeared normal in size. Cardiac valves

appeared structurally and functionally normal.

 

1.  Small pericardial effusion over the lateral wall of the right ventricle and

anteriorly. No diastolic chamber collapse. No significant variation of

intracardiac velocities.

 

2. Normal left ventricle internal dimensions, wall thickness, regional wall

motion, wall thickening, systolic and diastolic function.

 

3. Normal right ventricle size and systolic function.

 

4. Normal size of the atria.

 

5. Structurally normal appearing cardiac valve.

## 2019-02-15 NOTE — REP
CT of the chest with IV contrast:

There is an anterior chest wall wound to the right of midline over the heart.

There is focal soft tissue density interposed between the heart and the anterior

chest wall which may represent a mediastinal hematoma.

No other mediastinal hematoma is identified.

There is a pericardial effusion measuring 12 mm in depth posteriorly and 4 mm in

depth anteriorly.

There is a focal crescentic shaped thin air collection over the anterior margin

of the heart on image 96.  This could represent a pneumomediastinum or

pneumopericardium.

There is no pneumothorax.

There is no hemothorax.

Lung fields otherwise clear.

The thoracic aorta is unremarkable.  Cardiac size is normal.

No rib or sternal fractures are identified.  No clavicle or scapular fractures

are identified.

Impression:

Anterior chest wall wound to the right of midline at the level of the heart.

Focal soft tissue density in the anterior mediastinum anterior to the heart on

the right, likely a mediastinal hematoma.

Small pericardial effusion.

Sliver of air in the anterior mediastinum.  This could be a pneumomediastinum or

pneumopericardium.

No pneumothorax or hemothorax.  Lung fields otherwise clear.  The ground-glass

densities identified on the prior study are no longer present.

No fractures are identified.

 

 

Electronically Signed by

Cristian Gaston MD 02/15/2019 05:08 P
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
## 2019-02-16 VITALS — SYSTOLIC BLOOD PRESSURE: 104 MMHG | DIASTOLIC BLOOD PRESSURE: 71 MMHG

## 2019-02-16 VITALS — SYSTOLIC BLOOD PRESSURE: 103 MMHG | DIASTOLIC BLOOD PRESSURE: 68 MMHG

## 2019-02-16 VITALS — DIASTOLIC BLOOD PRESSURE: 59 MMHG | SYSTOLIC BLOOD PRESSURE: 92 MMHG

## 2019-02-16 VITALS — DIASTOLIC BLOOD PRESSURE: 76 MMHG | SYSTOLIC BLOOD PRESSURE: 120 MMHG

## 2019-02-16 VITALS — DIASTOLIC BLOOD PRESSURE: 74 MMHG | SYSTOLIC BLOOD PRESSURE: 102 MMHG

## 2019-02-16 VITALS — DIASTOLIC BLOOD PRESSURE: 76 MMHG | SYSTOLIC BLOOD PRESSURE: 108 MMHG

## 2019-02-16 VITALS — DIASTOLIC BLOOD PRESSURE: 75 MMHG | SYSTOLIC BLOOD PRESSURE: 106 MMHG

## 2019-02-16 LAB
BASE EXCESS BLDA CALC-SCNC: -2.8 MMOL/L (ref -2–2)
BASOPHILS # BLD AUTO: 0.1 10^3/UL (ref 0–0.2)
BASOPHILS NFR BLD AUTO: 0.3 % (ref 0–1)
BUN SERPL-MCNC: 8 MG/DL (ref 7–18)
CALCIUM SERPL-MCNC: 7.6 MG/DL (ref 8.5–10.1)
CHLORIDE SERPL-SCNC: 108 MEQ/L (ref 98–107)
CO2 BLDA CALC-SCNC: 23 MEQ/L (ref 22–29)
CO2 SERPL-SCNC: 23 MEQ/L (ref 21–32)
CREAT SERPL-MCNC: 0.87 MG/DL (ref 0.7–1.3)
EOSINOPHIL # BLD AUTO: 0 10^3/UL (ref 0–0.5)
EOSINOPHIL NFR BLD AUTO: 0.1 % (ref 0–3)
GFR SERPL CREATININE-BSD FRML MDRD: > 60 ML/MIN/{1.73_M2} (ref 60–?)
GLUCOSE SERPL-MCNC: 171 MG/DL (ref 70–100)
HCO3 BLDA-SCNC: 21.9 MEQ/L (ref 22–26)
HCO3 STD BLDA-SCNC: 22.1 MEQ/L (ref 22–26)
HCT VFR BLD AUTO: 40.4 % (ref 42–52)
HGB BLD-MCNC: 13.5 G/DL (ref 13.5–17.5)
LYMPHOCYTES # BLD AUTO: 1.2 10^3/UL (ref 1.5–4.5)
LYMPHOCYTES NFR BLD AUTO: 8.3 % (ref 24–44)
MCH RBC QN AUTO: 32.1 PG (ref 27–33)
MCHC RBC AUTO-ENTMCNC: 33.4 G/DL (ref 32–36.5)
MCV RBC AUTO: 96 FL (ref 80–96)
MONOCYTES # BLD AUTO: 0.9 10^3/UL (ref 0–0.8)
MONOCYTES NFR BLD AUTO: 6.3 % (ref 0–5)
NEUTROPHILS # BLD AUTO: 12.2 10^3/UL (ref 1.8–7.7)
NEUTROPHILS NFR BLD AUTO: 84.6 % (ref 36–66)
PCO2 BLDA: 38 MMHG (ref 35–45)
PH BLDA: 7.38 UNITS (ref 7.35–7.45)
PLATELET # BLD AUTO: 184 10^3/UL (ref 150–450)
PO2 BLDA: 86.5 MMHG (ref 75–100)
POTASSIUM SERPL-SCNC: 4.3 MEQ/L (ref 3.5–5.1)
RBC # BLD AUTO: 4.21 10^6/UL (ref 4.3–6.1)
SAO2 % BLDA: 96.8 % (ref 95–99)
SODIUM SERPL-SCNC: 135 MEQ/L (ref 136–145)
WBC # BLD AUTO: 14.4 10^3/UL (ref 4–10)

## 2019-02-16 RX ADMIN — CEFAZOLIN SODIUM SCH MLS/HR: 1 INJECTION, POWDER, FOR SOLUTION INTRAMUSCULAR; INTRAVENOUS at 18:16

## 2019-02-16 RX ADMIN — DOCUSATE SODIUM SCH MG: 100 CAPSULE, LIQUID FILLED ORAL at 09:28

## 2019-02-16 RX ADMIN — KETOROLAC TROMETHAMINE SCH MG: 30 INJECTION, SOLUTION INTRAMUSCULAR at 11:47

## 2019-02-16 RX ADMIN — SODIUM CHLORIDE SCH UNITS: 4.5 INJECTION, SOLUTION INTRAVENOUS at 21:14

## 2019-02-16 RX ADMIN — TIOTROPIUM BROMIDE SCH INHALATION: 18 CAPSULE ORAL; RESPIRATORY (INHALATION) at 08:01

## 2019-02-16 RX ADMIN — SODIUM CHLORIDE SCH UNITS: 4.5 INJECTION, SOLUTION INTRAVENOUS at 09:28

## 2019-02-16 RX ADMIN — PANTOPRAZOLE SODIUM SCH MG: 40 TABLET, DELAYED RELEASE ORAL at 09:28

## 2019-02-16 RX ADMIN — DOCUSATE SODIUM SCH MG: 100 CAPSULE, LIQUID FILLED ORAL at 20:23

## 2019-02-16 RX ADMIN — KETOROLAC TROMETHAMINE SCH MG: 30 INJECTION, SOLUTION INTRAMUSCULAR at 00:05

## 2019-02-16 RX ADMIN — CEFAZOLIN SODIUM SCH MLS/HR: 1 INJECTION, POWDER, FOR SOLUTION INTRAMUSCULAR; INTRAVENOUS at 11:47

## 2019-02-16 RX ADMIN — POTASSIUM CHLORIDE, DEXTROSE MONOHYDRATE AND SODIUM CHLORIDE SCH MLS/HR: 150; 5; 900 INJECTION, SOLUTION INTRAVENOUS at 10:20

## 2019-02-16 RX ADMIN — LEVALBUTEROL HYDROCHLORIDE SCH MG: 1.25 SOLUTION, CONCENTRATE RESPIRATORY (INHALATION) at 02:09

## 2019-02-16 RX ADMIN — KETOROLAC TROMETHAMINE SCH MG: 30 INJECTION, SOLUTION INTRAMUSCULAR at 05:19

## 2019-02-16 RX ADMIN — CEFAZOLIN SODIUM SCH MLS/HR: 1 INJECTION, POWDER, FOR SOLUTION INTRAMUSCULAR; INTRAVENOUS at 02:07

## 2019-02-16 RX ADMIN — LEVALBUTEROL HYDROCHLORIDE SCH MG: 1.25 SOLUTION, CONCENTRATE RESPIRATORY (INHALATION) at 08:00

## 2019-02-16 RX ADMIN — KETOROLAC TROMETHAMINE SCH MG: 30 INJECTION, SOLUTION INTRAMUSCULAR at 18:17

## 2019-02-16 RX ADMIN — LEVALBUTEROL HYDROCHLORIDE SCH MG: 1.25 SOLUTION, CONCENTRATE RESPIRATORY (INHALATION) at 14:56

## 2019-02-16 RX ADMIN — MAGNESIUM HYDROXIDE SCH ML: 400 SUSPENSION ORAL at 09:00

## 2019-02-16 RX ADMIN — LEVALBUTEROL HYDROCHLORIDE SCH MG: 1.25 SOLUTION, CONCENTRATE RESPIRATORY (INHALATION) at 20:18
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
## 2019-02-16 NOTE — ECGEPIP
Stationary ECG Study

                           [facility] - ED

                                       

                                       Test Date:    2019-02-15

Pat Name:     MYKE BOWMAN             Department:   

Patient ID:   [id number]                 Room:         [place] [address]

Gender:       M                        Technician:   [id number]

:          1970               Requested By: JOY ARIAS

Order Number: [id number]     Reading MD:   Geovanna Wright

                                 Measurements

Intervals                              Axis          

Rate:         70                       P:            80

OK:           158                      QRS:          80

QRSD:         118                      T:            51

QT:           399                                    

QTc:          431                                    

                           Interpretive Statements

SINUS RHYTHM

INDETERMINATE AXIS

INCOMPLETE RIGHT BUNDLE BRANCH BLOCK

PROBABLE INFERIOR MYOCARDIAL INFARCTION, PROBABLY OLD

NO PRIOR FOR COMPARISON

Electronically Signed On 2019 19:30:15 EST by Geovanna Wright
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
## 2019-02-17 VITALS — SYSTOLIC BLOOD PRESSURE: 103 MMHG | DIASTOLIC BLOOD PRESSURE: 66 MMHG

## 2019-02-17 VITALS — SYSTOLIC BLOOD PRESSURE: 98 MMHG | DIASTOLIC BLOOD PRESSURE: 68 MMHG

## 2019-02-17 VITALS — SYSTOLIC BLOOD PRESSURE: 102 MMHG | DIASTOLIC BLOOD PRESSURE: 69 MMHG

## 2019-02-17 VITALS — SYSTOLIC BLOOD PRESSURE: 100 MMHG | DIASTOLIC BLOOD PRESSURE: 64 MMHG

## 2019-02-17 VITALS — DIASTOLIC BLOOD PRESSURE: 74 MMHG | SYSTOLIC BLOOD PRESSURE: 97 MMHG

## 2019-02-17 VITALS — SYSTOLIC BLOOD PRESSURE: 107 MMHG | DIASTOLIC BLOOD PRESSURE: 63 MMHG

## 2019-02-17 LAB
BASOPHILS # BLD AUTO: 0 10^3/UL (ref 0–0.2)
BASOPHILS NFR BLD AUTO: 0.3 % (ref 0–1)
BUN SERPL-MCNC: 11 MG/DL (ref 7–18)
CALCIUM SERPL-MCNC: 7.8 MG/DL (ref 8.5–10.1)
CHLORIDE SERPL-SCNC: 109 MEQ/L (ref 98–107)
CO2 SERPL-SCNC: 27 MEQ/L (ref 21–32)
CREAT SERPL-MCNC: 0.89 MG/DL (ref 0.7–1.3)
EOSINOPHIL # BLD AUTO: 0.1 10^3/UL (ref 0–0.5)
EOSINOPHIL NFR BLD AUTO: 0.7 % (ref 0–3)
GFR SERPL CREATININE-BSD FRML MDRD: > 60 ML/MIN/{1.73_M2} (ref 60–?)
GLUCOSE SERPL-MCNC: 121 MG/DL (ref 70–100)
HCT VFR BLD AUTO: 36.2 % (ref 42–52)
HGB BLD-MCNC: 12.1 G/DL (ref 13.5–17.5)
LYMPHOCYTES # BLD AUTO: 1.9 10^3/UL (ref 1.5–4.5)
LYMPHOCYTES NFR BLD AUTO: 16.2 % (ref 24–44)
MCH RBC QN AUTO: 32.4 PG (ref 27–33)
MCHC RBC AUTO-ENTMCNC: 33.4 G/DL (ref 32–36.5)
MCV RBC AUTO: 96.8 FL (ref 80–96)
MONOCYTES # BLD AUTO: 0.9 10^3/UL (ref 0–0.8)
MONOCYTES NFR BLD AUTO: 7.6 % (ref 0–5)
NEUTROPHILS # BLD AUTO: 8.7 10^3/UL (ref 1.8–7.7)
NEUTROPHILS NFR BLD AUTO: 74.7 % (ref 36–66)
PLATELET # BLD AUTO: 162 10^3/UL (ref 150–450)
POTASSIUM SERPL-SCNC: 4.5 MEQ/L (ref 3.5–5.1)
RBC # BLD AUTO: 3.74 10^6/UL (ref 4.3–6.1)
SODIUM SERPL-SCNC: 139 MEQ/L (ref 136–145)
WBC # BLD AUTO: 11.7 10^3/UL (ref 4–10)

## 2019-02-17 RX ADMIN — KETOROLAC TROMETHAMINE SCH MG: 30 INJECTION, SOLUTION INTRAMUSCULAR at 00:25

## 2019-02-17 RX ADMIN — SODIUM CHLORIDE SCH UNITS: 4.5 INJECTION, SOLUTION INTRAVENOUS at 21:13

## 2019-02-17 RX ADMIN — KETOROLAC TROMETHAMINE SCH MG: 30 INJECTION, SOLUTION INTRAMUSCULAR at 11:57

## 2019-02-17 RX ADMIN — DOCUSATE SODIUM SCH MG: 100 CAPSULE, LIQUID FILLED ORAL at 08:52

## 2019-02-17 RX ADMIN — CEFAZOLIN SODIUM SCH MLS/HR: 1 INJECTION, POWDER, FOR SOLUTION INTRAMUSCULAR; INTRAVENOUS at 11:57

## 2019-02-17 RX ADMIN — CEFAZOLIN SODIUM SCH MLS/HR: 1 INJECTION, POWDER, FOR SOLUTION INTRAMUSCULAR; INTRAVENOUS at 03:08

## 2019-02-17 RX ADMIN — POTASSIUM CHLORIDE, DEXTROSE MONOHYDRATE AND SODIUM CHLORIDE SCH MLS/HR: 150; 5; 900 INJECTION, SOLUTION INTRAVENOUS at 00:24

## 2019-02-17 RX ADMIN — CEFAZOLIN SODIUM SCH MLS/HR: 1 INJECTION, POWDER, FOR SOLUTION INTRAMUSCULAR; INTRAVENOUS at 18:07

## 2019-02-17 RX ADMIN — TIOTROPIUM BROMIDE SCH INHALATION: 18 CAPSULE ORAL; RESPIRATORY (INHALATION) at 07:30

## 2019-02-17 RX ADMIN — KETOROLAC TROMETHAMINE SCH MG: 30 INJECTION, SOLUTION INTRAMUSCULAR at 06:11

## 2019-02-17 RX ADMIN — LEVALBUTEROL HYDROCHLORIDE SCH MG: 1.25 SOLUTION, CONCENTRATE RESPIRATORY (INHALATION) at 20:19

## 2019-02-17 RX ADMIN — PANTOPRAZOLE SODIUM SCH MG: 40 TABLET, DELAYED RELEASE ORAL at 08:52

## 2019-02-17 RX ADMIN — KETOROLAC TROMETHAMINE SCH MG: 30 INJECTION, SOLUTION INTRAMUSCULAR at 18:07

## 2019-02-17 RX ADMIN — MAGNESIUM HYDROXIDE SCH ML: 400 SUSPENSION ORAL at 08:52

## 2019-02-17 RX ADMIN — SODIUM CHLORIDE SCH UNITS: 4.5 INJECTION, SOLUTION INTRAVENOUS at 08:52

## 2019-02-17 RX ADMIN — DOCUSATE SODIUM SCH MG: 100 CAPSULE, LIQUID FILLED ORAL at 21:13

## 2019-02-17 RX ADMIN — LEVALBUTEROL HYDROCHLORIDE SCH MG: 1.25 SOLUTION, CONCENTRATE RESPIRATORY (INHALATION) at 07:30

## 2019-02-17 RX ADMIN — LEVALBUTEROL HYDROCHLORIDE SCH MG: 1.25 SOLUTION, CONCENTRATE RESPIRATORY (INHALATION) at 01:45

## 2019-02-17 RX ADMIN — LEVALBUTEROL HYDROCHLORIDE SCH MG: 1.25 SOLUTION, CONCENTRATE RESPIRATORY (INHALATION) at 14:00
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
## 2019-02-18 VITALS — DIASTOLIC BLOOD PRESSURE: 82 MMHG | SYSTOLIC BLOOD PRESSURE: 128 MMHG

## 2019-02-18 VITALS — SYSTOLIC BLOOD PRESSURE: 112 MMHG | DIASTOLIC BLOOD PRESSURE: 74 MMHG

## 2019-02-18 VITALS — DIASTOLIC BLOOD PRESSURE: 87 MMHG | SYSTOLIC BLOOD PRESSURE: 122 MMHG

## 2019-02-18 VITALS — DIASTOLIC BLOOD PRESSURE: 63 MMHG | SYSTOLIC BLOOD PRESSURE: 106 MMHG

## 2019-02-18 VITALS — SYSTOLIC BLOOD PRESSURE: 112 MMHG | DIASTOLIC BLOOD PRESSURE: 70 MMHG

## 2019-02-18 LAB
BASOPHILS # BLD AUTO: 0.1 10^3/UL (ref 0–0.2)
BASOPHILS NFR BLD AUTO: 0.5 % (ref 0–1)
BUN SERPL-MCNC: 14 MG/DL (ref 7–18)
CALCIUM SERPL-MCNC: 8 MG/DL (ref 8.5–10.1)
CHLORIDE SERPL-SCNC: 110 MEQ/L (ref 98–107)
CO2 SERPL-SCNC: 26 MEQ/L (ref 21–32)
CREAT SERPL-MCNC: 0.87 MG/DL (ref 0.7–1.3)
EOSINOPHIL # BLD AUTO: 0.3 10^3/UL (ref 0–0.5)
EOSINOPHIL NFR BLD AUTO: 2.6 % (ref 0–3)
GFR SERPL CREATININE-BSD FRML MDRD: > 60 ML/MIN/{1.73_M2} (ref 60–?)
GLUCOSE SERPL-MCNC: 95 MG/DL (ref 70–100)
HCT VFR BLD AUTO: 37.3 % (ref 42–52)
HGB BLD-MCNC: 12.5 G/DL (ref 13.5–17.5)
LYMPHOCYTES # BLD AUTO: 1.8 10^3/UL (ref 1.5–4.5)
LYMPHOCYTES NFR BLD AUTO: 17.9 % (ref 24–44)
MCH RBC QN AUTO: 32.3 PG (ref 27–33)
MCHC RBC AUTO-ENTMCNC: 33.5 G/DL (ref 32–36.5)
MCV RBC AUTO: 96.4 FL (ref 80–96)
MONOCYTES # BLD AUTO: 0.8 10^3/UL (ref 0–0.8)
MONOCYTES NFR BLD AUTO: 8.3 % (ref 0–5)
NEUTROPHILS # BLD AUTO: 7.1 10^3/UL (ref 1.8–7.7)
NEUTROPHILS NFR BLD AUTO: 70.4 % (ref 36–66)
PLATELET # BLD AUTO: 176 10^3/UL (ref 150–450)
POTASSIUM SERPL-SCNC: 4.2 MEQ/L (ref 3.5–5.1)
RBC # BLD AUTO: 3.87 10^6/UL (ref 4.3–6.1)
SODIUM SERPL-SCNC: 140 MEQ/L (ref 136–145)
WBC # BLD AUTO: 10.1 10^3/UL (ref 4–10)

## 2019-02-18 RX ADMIN — SODIUM CHLORIDE SCH UNITS: 4.5 INJECTION, SOLUTION INTRAVENOUS at 09:00

## 2019-02-18 RX ADMIN — KETOROLAC TROMETHAMINE SCH MG: 30 INJECTION, SOLUTION INTRAMUSCULAR at 18:00

## 2019-02-18 RX ADMIN — KETOROLAC TROMETHAMINE SCH MG: 30 INJECTION, SOLUTION INTRAMUSCULAR at 00:40

## 2019-02-18 RX ADMIN — PANTOPRAZOLE SODIUM SCH MG: 40 TABLET, DELAYED RELEASE ORAL at 09:10

## 2019-02-18 RX ADMIN — LEVALBUTEROL HYDROCHLORIDE SCH MG: 1.25 SOLUTION, CONCENTRATE RESPIRATORY (INHALATION) at 19:50

## 2019-02-18 RX ADMIN — KETOROLAC TROMETHAMINE SCH MG: 30 INJECTION, SOLUTION INTRAMUSCULAR at 11:55

## 2019-02-18 RX ADMIN — LEVALBUTEROL HYDROCHLORIDE SCH MG: 1.25 SOLUTION, CONCENTRATE RESPIRATORY (INHALATION) at 07:38

## 2019-02-18 RX ADMIN — DOCUSATE SODIUM SCH MG: 100 CAPSULE, LIQUID FILLED ORAL at 09:10

## 2019-02-18 RX ADMIN — LEVALBUTEROL HYDROCHLORIDE SCH MG: 1.25 SOLUTION, CONCENTRATE RESPIRATORY (INHALATION) at 00:47

## 2019-02-18 RX ADMIN — DOCUSATE SODIUM SCH MG: 100 CAPSULE, LIQUID FILLED ORAL at 20:17

## 2019-02-18 RX ADMIN — LEVALBUTEROL HYDROCHLORIDE SCH MG: 1.25 SOLUTION, CONCENTRATE RESPIRATORY (INHALATION) at 13:27

## 2019-02-18 RX ADMIN — MAGNESIUM HYDROXIDE SCH ML: 400 SUSPENSION ORAL at 09:00

## 2019-02-18 RX ADMIN — KETOROLAC TROMETHAMINE SCH MG: 30 INJECTION, SOLUTION INTRAMUSCULAR at 05:52

## 2019-02-18 RX ADMIN — TIOTROPIUM BROMIDE SCH INHALATION: 18 CAPSULE ORAL; RESPIRATORY (INHALATION) at 07:38

## 2019-02-18 RX ADMIN — SODIUM CHLORIDE SCH UNITS: 4.5 INJECTION, SOLUTION INTRAVENOUS at 20:17
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
## 2019-03-02 ENCOUNTER — HOSPITAL ENCOUNTER (INPATIENT)
Dept: HOSPITAL 53 - M ED | Age: 49
LOS: 1 days | Discharge: HOME | DRG: 921 | End: 2019-03-03
Attending: THORACIC SURGERY (CARDIOTHORACIC VASCULAR SURGERY) | Admitting: THORACIC SURGERY (CARDIOTHORACIC VASCULAR SURGERY)
Payer: COMMERCIAL

## 2019-03-02 VITALS — HEIGHT: 66 IN | BODY MASS INDEX: 21.26 KG/M2 | WEIGHT: 132.28 LBS

## 2019-03-02 VITALS — DIASTOLIC BLOOD PRESSURE: 85 MMHG | SYSTOLIC BLOOD PRESSURE: 126 MMHG

## 2019-03-02 DIAGNOSIS — Z79.82: ICD-10-CM

## 2019-03-02 DIAGNOSIS — T81.31XA: Primary | ICD-10-CM

## 2019-03-02 DIAGNOSIS — Z79.899: ICD-10-CM
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
## 2019-03-02 LAB
ALBUMIN SERPL BCG-MCNC: 3.8 GM/DL (ref 3.2–5.2)
ALT SERPL W P-5'-P-CCNC: 28 U/L (ref 12–78)
APTT BLD: 26.5 SECONDS (ref 25.4–37.6)
BASOPHILS # BLD AUTO: 0.1 10^3/UL (ref 0–0.2)
BASOPHILS NFR BLD AUTO: 0.8 % (ref 0–1)
BILIRUB CONJ SERPL-MCNC: < 0.1 MG/DL (ref 0–0.2)
BILIRUB SERPL-MCNC: 0.2 MG/DL (ref 0.2–1)
BUN SERPL-MCNC: 8 MG/DL (ref 7–18)
CALCIUM SERPL-MCNC: 8.7 MG/DL (ref 8.5–10.1)
CHLORIDE SERPL-SCNC: 106 MEQ/L (ref 98–107)
CK MB CFR.DF SERPL CALC: 1.85
CK MB SERPL-MCNC: < 1 NG/ML (ref ?–3.6)
CK SERPL-CCNC: 54 U/L (ref 39–308)
CO2 SERPL-SCNC: 25 MEQ/L (ref 21–32)
CREAT SERPL-MCNC: 0.89 MG/DL (ref 0.7–1.3)
CRP SERPL-MCNC: < 0.3 MG/DL (ref 0–0.3)
EOSINOPHIL # BLD AUTO: 0.7 10^3/UL (ref 0–0.5)
EOSINOPHIL NFR BLD AUTO: 5.1 % (ref 0–3)
ERYTHROCYTE [SEDIMENTATION RATE] IN BLOOD BY WESTERGREN METHOD: 4 MM/HR (ref 0–15)
GFR SERPL CREATININE-BSD FRML MDRD: > 60 ML/MIN/{1.73_M2} (ref 60–?)
GLUCOSE SERPL-MCNC: 78 MG/DL (ref 70–100)
HCT VFR BLD AUTO: 43.8 % (ref 42–52)
HGB BLD-MCNC: 15.2 G/DL (ref 13.5–17.5)
INR PPP: 0.86
LYMPHOCYTES # BLD AUTO: 3.7 10^3/UL (ref 1.5–4.5)
LYMPHOCYTES NFR BLD AUTO: 28.4 % (ref 24–44)
MCH RBC QN AUTO: 33 PG (ref 27–33)
MCHC RBC AUTO-ENTMCNC: 34.7 G/DL (ref 32–36.5)
MCV RBC AUTO: 95.2 FL (ref 80–96)
MONOCYTES # BLD AUTO: 0.7 10^3/UL (ref 0–0.8)
MONOCYTES NFR BLD AUTO: 5.5 % (ref 0–5)
NEUTROPHILS # BLD AUTO: 7.8 10^3/UL (ref 1.8–7.7)
NEUTROPHILS NFR BLD AUTO: 59.4 % (ref 36–66)
PLATELET # BLD AUTO: 347 10^3/UL (ref 150–450)
POTASSIUM SERPL-SCNC: 4.1 MEQ/L (ref 3.5–5.1)
PROT SERPL-MCNC: 6.6 GM/DL (ref 6.4–8.2)
PROTHROMBIN TIME: 11.8 SECONDS (ref 12.1–14.4)
RBC # BLD AUTO: 4.6 10^6/UL (ref 4.3–6.1)
SODIUM SERPL-SCNC: 140 MEQ/L (ref 136–145)
TROPONIN I SERPL-MCNC: < 0.02 NG/ML (ref ?–0.1)
WBC # BLD AUTO: 13.1 10^3/UL (ref 4–10)

## 2019-03-02 RX ADMIN — DOCUSATE SODIUM SCH MG: 100 CAPSULE, LIQUID FILLED ORAL at 23:10

## 2019-03-02 NOTE — REP
Clinical:  Chest wall drainage.

 

Technique:  Axial noncontrast images from the thoracic inlet to the upper abdomen

with coronal and sagittal re-formations.

 

Comparison:  02/15/2019.

 

Findings:

With comparison to prior examination, there is only minimal area of

opacity/atelectasis along the anteromedial right middle lobe and small amount of

subcutaneous emphysema in the sub xyphoid anterior midline soft tissue.  New

findings include a small right pleural effusion with associated right basilar

atelectasis.  A small area of fibro atelectatic changes in the posterior aspect

of the right upper lobe (images 34 - 39) has improved.  Underlying chronic

advanced emphysematous changes are again noted.  No significant adenopathy.  The

mediastinum demonstrates mild atherosclerotic changes to the thoracic aorta and

coronary arteries without aortic aneurysm, cardiomegaly or pericardial effusion.

The osseous structures appear intact.

 

Limited upper abdomen demonstrates normal bilateral adrenal glands.

 

Impression:

1.  Small amount of residual opacity/atelectasis along the anteromedial right

middle lobe and small amount of subcutaneous emphysema in the sub xyphoid soft

tissue both of which appear improved.

2.  Small area of fibro atelectatic changes possibly scarring in the posterior

aspect of the right upper lobe improved.

3.  New small right pleural effusion and right basilar atelectasis.

4.  Underlying chronic advanced emphysematous disease.

 

 

Electronically Signed by

Jass Strong MD 03/02/2019 08:24 P

## 2019-03-03 VITALS — DIASTOLIC BLOOD PRESSURE: 79 MMHG | SYSTOLIC BLOOD PRESSURE: 119 MMHG

## 2019-03-03 VITALS — SYSTOLIC BLOOD PRESSURE: 104 MMHG | DIASTOLIC BLOOD PRESSURE: 69 MMHG

## 2019-03-03 LAB
BASOPHILS # BLD AUTO: 0.1 10^3/UL (ref 0–0.2)
BASOPHILS NFR BLD AUTO: 0.7 % (ref 0–1)
BUN SERPL-MCNC: 8 MG/DL (ref 7–18)
CALCIUM SERPL-MCNC: 8.3 MG/DL (ref 8.5–10.1)
CHLORIDE SERPL-SCNC: 108 MEQ/L (ref 98–107)
CO2 SERPL-SCNC: 27 MEQ/L (ref 21–32)
CREAT SERPL-MCNC: 0.92 MG/DL (ref 0.7–1.3)
EOSINOPHIL # BLD AUTO: 0.5 10^3/UL (ref 0–0.5)
EOSINOPHIL NFR BLD AUTO: 5.1 % (ref 0–3)
GFR SERPL CREATININE-BSD FRML MDRD: > 60 ML/MIN/{1.73_M2} (ref 60–?)
GLUCOSE SERPL-MCNC: 99 MG/DL (ref 70–100)
HCT VFR BLD AUTO: 41.6 % (ref 42–52)
HGB BLD-MCNC: 14.3 G/DL (ref 13.5–17.5)
LYMPHOCYTES # BLD AUTO: 2.2 10^3/UL (ref 1.5–4.5)
LYMPHOCYTES NFR BLD AUTO: 22.2 % (ref 24–44)
MCH RBC QN AUTO: 32.2 PG (ref 27–33)
MCHC RBC AUTO-ENTMCNC: 34.4 G/DL (ref 32–36.5)
MCV RBC AUTO: 93.7 FL (ref 80–96)
MONOCYTES # BLD AUTO: 0.8 10^3/UL (ref 0–0.8)
MONOCYTES NFR BLD AUTO: 8.2 % (ref 0–5)
NEUTROPHILS # BLD AUTO: 6.4 10^3/UL (ref 1.8–7.7)
NEUTROPHILS NFR BLD AUTO: 63.3 % (ref 36–66)
PLATELET # BLD AUTO: 312 10^3/UL (ref 150–450)
POTASSIUM SERPL-SCNC: 4.3 MEQ/L (ref 3.5–5.1)
RBC # BLD AUTO: 4.44 10^6/UL (ref 4.3–6.1)
SODIUM SERPL-SCNC: 140 MEQ/L (ref 136–145)
WBC # BLD AUTO: 10.1 10^3/UL (ref 4–10)

## 2019-03-03 RX ADMIN — DOCUSATE SODIUM SCH MG: 100 CAPSULE, LIQUID FILLED ORAL at 07:51

## 2019-03-03 NOTE — REP
Clinical:  Pericarditis.

 

Comparison:  02/18/2019.

 

Findings:

Mediastinum and cardiac silhouette are normal.  Surgical clips overlying the

right lower lung zone again noted and unchanged.  Suspected chronic

pleuroparenchymal changes along the diaphragmatic surfaces with blunting of the

costophrenic angles is suggested.  Lateral radiograph demonstrates no significant

basilar opacity or definite effusion.  No pneumothorax.  Skeletal structures

intact.

 

Impression:

Suspected chronic pleuroparenchymal changes along the bilateral lung bases.

 

 

Electronically Signed by

Jass Strong MD 03/03/2019 08:39 A

## 2019-03-03 NOTE — ECGEPIP
Stationary ECG Study

                           [facility] - ED

                                       

                                       Test Date:    2019

Pat Name:     MYKE BOWMAN             Department:   

Patient ID:   [id number]                 Room:         [place] [id number]

Gender:       M                        Technician:   BELKIS

:          1970               Requested By: SAGE REDDY

Order Number: [id number]     Reading MD:   Geovanna Wright

                                 Measurements

Intervals                              Axis          

Rate:         78                       P:            76

TN:           152                      QRS:          75

QRSD:         107                      T:            66

QT:           352                                    

QTc:          402                                    

                           Interpretive Statements

SINUS RHYTHM

POSSIBLE LEFT ATRIAL ENLARGEMENT

INDETERMINATE AXIS

INCOMPLETE RIGHT BUNDLE BRANCH BLOCK

POSSIBLE INFERIOR MYOCARDIAL INFARCTION, PROBABLY OLD

MODERATE T-WAVE ABNORMALITY, CONSIDER ANTERIOR ISCHEMIA, NEW 2/15/19

 

Electronically Signed On 3-3-2019 20:03:11 EST by Geovanna Wright
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
## 2019-03-04 ENCOUNTER — HOSPITAL ENCOUNTER (INPATIENT)
Dept: HOSPITAL 53 - M ED | Age: 49
LOS: 18 days | Discharge: HOME | DRG: 941 | End: 2019-03-22
Attending: THORACIC SURGERY (CARDIOTHORACIC VASCULAR SURGERY) | Admitting: THORACIC SURGERY (CARDIOTHORACIC VASCULAR SURGERY)
Payer: COMMERCIAL

## 2019-03-04 VITALS — WEIGHT: 162.26 LBS | HEIGHT: 66 IN | BODY MASS INDEX: 26.08 KG/M2

## 2019-03-04 VITALS — DIASTOLIC BLOOD PRESSURE: 83 MMHG | SYSTOLIC BLOOD PRESSURE: 110 MMHG

## 2019-03-04 VITALS — DIASTOLIC BLOOD PRESSURE: 77 MMHG | SYSTOLIC BLOOD PRESSURE: 110 MMHG

## 2019-03-04 VITALS — DIASTOLIC BLOOD PRESSURE: 66 MMHG | SYSTOLIC BLOOD PRESSURE: 110 MMHG

## 2019-03-04 DIAGNOSIS — Z79.82: ICD-10-CM

## 2019-03-04 DIAGNOSIS — T81.33XD: Primary | ICD-10-CM
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
## 2019-03-04 DIAGNOSIS — J44.9: ICD-10-CM

## 2019-03-04 DIAGNOSIS — Z79.899: ICD-10-CM

## 2019-03-04 RX ADMIN — KETOROLAC TROMETHAMINE SCH MG: 30 INJECTION, SOLUTION INTRAMUSCULAR at 17:04

## 2019-03-04 RX ADMIN — DOCUSATE SODIUM SCH MG: 100 CAPSULE, LIQUID FILLED ORAL at 20:30

## 2019-03-04 RX ADMIN — DOCUSATE SODIUM SCH MG: 100 CAPSULE, LIQUID FILLED ORAL at 09:00

## 2019-03-04 RX ADMIN — KETOROLAC TROMETHAMINE SCH MG: 30 INJECTION, SOLUTION INTRAMUSCULAR at 20:00

## 2019-03-04 RX ADMIN — SODIUM CHLORIDE SCH UNITS: 4.5 INJECTION, SOLUTION INTRAVENOUS at 20:31

## 2019-03-04 RX ADMIN — MAGNESIUM HYDROXIDE SCH ML: 400 SUSPENSION ORAL at 09:00

## 2019-03-04 RX ADMIN — CEFAZOLIN SODIUM SCH MLS/HR: 1 INJECTION, POWDER, FOR SOLUTION INTRAMUSCULAR; INTRAVENOUS at 23:56

## 2019-03-04 RX ADMIN — CEFAZOLIN SODIUM SCH MLS/HR: 1 INJECTION, POWDER, FOR SOLUTION INTRAMUSCULAR; INTRAVENOUS at 15:15
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
## 2019-03-05 VITALS — DIASTOLIC BLOOD PRESSURE: 69 MMHG | SYSTOLIC BLOOD PRESSURE: 110 MMHG

## 2019-03-05 VITALS — SYSTOLIC BLOOD PRESSURE: 120 MMHG | DIASTOLIC BLOOD PRESSURE: 73 MMHG

## 2019-03-05 VITALS — DIASTOLIC BLOOD PRESSURE: 71 MMHG | SYSTOLIC BLOOD PRESSURE: 116 MMHG

## 2019-03-05 VITALS — DIASTOLIC BLOOD PRESSURE: 80 MMHG | SYSTOLIC BLOOD PRESSURE: 130 MMHG

## 2019-03-05 VITALS — SYSTOLIC BLOOD PRESSURE: 153 MMHG | DIASTOLIC BLOOD PRESSURE: 72 MMHG

## 2019-03-05 LAB
BASOPHILS # BLD AUTO: 0.1 10^3/UL (ref 0–0.2)
BASOPHILS NFR BLD AUTO: 0.6 % (ref 0–1)
EOSINOPHIL # BLD AUTO: 0.8 10^3/UL (ref 0–0.5)
EOSINOPHIL NFR BLD AUTO: 7 % (ref 0–3)
HCT VFR BLD AUTO: 40.4 % (ref 42–52)
HGB BLD-MCNC: 13.8 G/DL (ref 13.5–17.5)
LYMPHOCYTES # BLD AUTO: 2.9 10^3/UL (ref 1.5–4.5)
LYMPHOCYTES NFR BLD AUTO: 26.6 % (ref 24–44)
MCH RBC QN AUTO: 32.6 PG (ref 27–33)
MCHC RBC AUTO-ENTMCNC: 34.2 G/DL (ref 32–36.5)
MCV RBC AUTO: 95.5 FL (ref 80–96)
MONOCYTES # BLD AUTO: 0.8 10^3/UL (ref 0–0.8)
MONOCYTES NFR BLD AUTO: 7 % (ref 0–5)
NEUTROPHILS # BLD AUTO: 6.3 10^3/UL (ref 1.8–7.7)
NEUTROPHILS NFR BLD AUTO: 58.1 % (ref 36–66)
PLATELET # BLD AUTO: 294 10^3/UL (ref 150–450)
RBC # BLD AUTO: 4.23 10^6/UL (ref 4.3–6.1)
WBC # BLD AUTO: 10.8 10^3/UL (ref 4–10)

## 2019-03-05 RX ADMIN — KETOROLAC TROMETHAMINE SCH MG: 30 INJECTION, SOLUTION INTRAMUSCULAR at 14:32

## 2019-03-05 RX ADMIN — CEFAZOLIN SODIUM SCH MLS/HR: 1 INJECTION, POWDER, FOR SOLUTION INTRAMUSCULAR; INTRAVENOUS at 16:44

## 2019-03-05 RX ADMIN — DEXTROSE MONOHYDRATE SCH MLS/HR: 50 INJECTION, SOLUTION INTRAVENOUS at 14:32

## 2019-03-05 RX ADMIN — DOCUSATE SODIUM SCH MG: 100 CAPSULE, LIQUID FILLED ORAL at 20:27

## 2019-03-05 RX ADMIN — CEFAZOLIN SODIUM SCH MLS/HR: 1 INJECTION, POWDER, FOR SOLUTION INTRAMUSCULAR; INTRAVENOUS at 06:10

## 2019-03-05 RX ADMIN — KETOROLAC TROMETHAMINE SCH MG: 30 INJECTION, SOLUTION INTRAMUSCULAR at 09:01

## 2019-03-05 RX ADMIN — KETOROLAC TROMETHAMINE SCH MG: 30 INJECTION, SOLUTION INTRAMUSCULAR at 02:00

## 2019-03-05 RX ADMIN — SODIUM CHLORIDE SCH UNITS: 4.5 INJECTION, SOLUTION INTRAVENOUS at 20:27

## 2019-03-05 RX ADMIN — SODIUM CHLORIDE SCH UNITS: 4.5 INJECTION, SOLUTION INTRAVENOUS at 09:01

## 2019-03-05 RX ADMIN — KETOROLAC TROMETHAMINE SCH MG: 30 INJECTION, SOLUTION INTRAMUSCULAR at 20:00

## 2019-03-05 RX ADMIN — DOCUSATE SODIUM SCH MG: 100 CAPSULE, LIQUID FILLED ORAL at 09:00

## 2019-03-05 RX ADMIN — MAGNESIUM HYDROXIDE SCH ML: 400 SUSPENSION ORAL at 09:00

## 2019-03-05 RX ADMIN — CEFAZOLIN SODIUM SCH MLS/HR: 1 INJECTION, POWDER, FOR SOLUTION INTRAMUSCULAR; INTRAVENOUS at 22:35
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
## 2019-03-05 NOTE — REP
PA and lateral chest:

Comparison is 03/03/2019.

There is a right pleural effusion that has increased in size.

The surgical staple lines superimposed over the medial lower right hemithorax on

the comparison study have been removed.

There is no pneumothorax.

The right and left lung fields otherwise clear.  Cardiac size normal.  The bekah,

mediastinum, skeletal structures are.

There is no pneumothorax or pneumomediastinum.

Impression:

Right pleural effusion.

Otherwise, negative PA and lateral chest.

 

 

Electronically Signed by

Cristian Gaston MD 03/05/2019 08:11 A

## 2019-03-06 VITALS — DIASTOLIC BLOOD PRESSURE: 72 MMHG | SYSTOLIC BLOOD PRESSURE: 126 MMHG

## 2019-03-06 VITALS — DIASTOLIC BLOOD PRESSURE: 71 MMHG | SYSTOLIC BLOOD PRESSURE: 126 MMHG

## 2019-03-06 VITALS — SYSTOLIC BLOOD PRESSURE: 108 MMHG | DIASTOLIC BLOOD PRESSURE: 69 MMHG

## 2019-03-06 VITALS — DIASTOLIC BLOOD PRESSURE: 74 MMHG | SYSTOLIC BLOOD PRESSURE: 114 MMHG

## 2019-03-06 VITALS — DIASTOLIC BLOOD PRESSURE: 60 MMHG | SYSTOLIC BLOOD PRESSURE: 110 MMHG

## 2019-03-06 VITALS — SYSTOLIC BLOOD PRESSURE: 110 MMHG | DIASTOLIC BLOOD PRESSURE: 77 MMHG

## 2019-03-06 VITALS — SYSTOLIC BLOOD PRESSURE: 120 MMHG | DIASTOLIC BLOOD PRESSURE: 76 MMHG

## 2019-03-06 VITALS — DIASTOLIC BLOOD PRESSURE: 71 MMHG | SYSTOLIC BLOOD PRESSURE: 115 MMHG

## 2019-03-06 VITALS — SYSTOLIC BLOOD PRESSURE: 113 MMHG | DIASTOLIC BLOOD PRESSURE: 78 MMHG

## 2019-03-06 VITALS — DIASTOLIC BLOOD PRESSURE: 64 MMHG | SYSTOLIC BLOOD PRESSURE: 103 MMHG

## 2019-03-06 LAB
BASOPHILS # BLD AUTO: 0.1 10^3/UL (ref 0–0.2)
BASOPHILS NFR BLD AUTO: 0.5 % (ref 0–1)
EOSINOPHIL # BLD AUTO: 0.7 10^3/UL (ref 0–0.5)
EOSINOPHIL NFR BLD AUTO: 6.4 % (ref 0–3)
HCT VFR BLD AUTO: 39.3 % (ref 42–52)
HGB BLD-MCNC: 13.5 G/DL (ref 13.5–17.5)
LYMPHOCYTES # BLD AUTO: 2.4 10^3/UL (ref 1.5–4.5)
LYMPHOCYTES NFR BLD AUTO: 21 % (ref 24–44)
MCH RBC QN AUTO: 32.6 PG (ref 27–33)
MCHC RBC AUTO-ENTMCNC: 34.4 G/DL (ref 32–36.5)
MCV RBC AUTO: 94.9 FL (ref 80–96)
MONOCYTES # BLD AUTO: 1.1 10^3/UL (ref 0–0.8)
MONOCYTES NFR BLD AUTO: 9.8 % (ref 0–5)
NEUTROPHILS # BLD AUTO: 7 10^3/UL (ref 1.8–7.7)
NEUTROPHILS NFR BLD AUTO: 61.8 % (ref 36–66)
PLATELET # BLD AUTO: 284 10^3/UL (ref 150–450)
RBC # BLD AUTO: 4.14 10^6/UL (ref 4.3–6.1)
WBC # BLD AUTO: 11.3 10^3/UL (ref 4–10)

## 2019-03-06 PROCEDURE — 0HB5XZZ EXCISION OF CHEST SKIN, EXTERNAL APPROACH: ICD-10-PCS | Performed by: PLASTIC SURGERY

## 2019-03-06 RX ADMIN — DEXTROSE MONOHYDRATE SCH MLS/HR: 50 INJECTION, SOLUTION INTRAVENOUS at 17:40

## 2019-03-06 RX ADMIN — KETOROLAC TROMETHAMINE SCH MG: 30 INJECTION, SOLUTION INTRAMUSCULAR at 20:14

## 2019-03-06 RX ADMIN — DEXTROSE MONOHYDRATE SCH MLS/HR: 5 INJECTION INTRAVENOUS at 20:13

## 2019-03-06 RX ADMIN — KETOROLAC TROMETHAMINE SCH MG: 30 INJECTION, SOLUTION INTRAMUSCULAR at 08:00

## 2019-03-06 RX ADMIN — DEXTROSE MONOHYDRATE SCH MLS/HR: 50 INJECTION, SOLUTION INTRAVENOUS at 02:37

## 2019-03-06 RX ADMIN — DOCUSATE SODIUM SCH MG: 100 CAPSULE, LIQUID FILLED ORAL at 09:00

## 2019-03-06 RX ADMIN — KETOROLAC TROMETHAMINE SCH MG: 30 INJECTION, SOLUTION INTRAMUSCULAR at 02:00

## 2019-03-06 RX ADMIN — SODIUM CHLORIDE SCH UNITS: 4.5 INJECTION, SOLUTION INTRAVENOUS at 09:00

## 2019-03-06 RX ADMIN — CEFAZOLIN SODIUM SCH MLS/HR: 1 INJECTION, POWDER, FOR SOLUTION INTRAMUSCULAR; INTRAVENOUS at 06:13

## 2019-03-06 RX ADMIN — SODIUM CHLORIDE SCH UNITS: 4.5 INJECTION, SOLUTION INTRAVENOUS at 20:14

## 2019-03-06 RX ADMIN — DOCUSATE SODIUM SCH MG: 100 CAPSULE, LIQUID FILLED ORAL at 20:14

## 2019-03-06 RX ADMIN — MAGNESIUM HYDROXIDE SCH ML: 400 SUSPENSION ORAL at 09:00

## 2019-03-06 RX ADMIN — KETOROLAC TROMETHAMINE SCH MG: 30 INJECTION, SOLUTION INTRAMUSCULAR at 14:00
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
## 2019-03-06 NOTE — POST-OPPD
Postoperative Procedure Note


Date Of Procedure:  Mar 6, 2019


PREOPERATIVE DIAGNOSIS: Lower sternum open wound





POSTOPERATIVE DIAGNOSIS: same





FINDINGS: 6x2.5 open wound 





PROCEDURE: Irrigation and debridement lower sternum wound with partial closure. 

Endoform and hydrofera blue application.





SURGEON: Dr Torres Wong





ANESTHESIA: General





SPECIMENS: debrided tissue sternal wound. Cultures





ESTIMATED BLOOD LOSS: 10cc





REPLACED: none





DRAINS: none





COMPLICATIONS: none





POSTOPERATIVE CONDITION: Stable











LITA PATTON DO               Mar 6, 2019 14:42

## 2019-03-07 VITALS — DIASTOLIC BLOOD PRESSURE: 87 MMHG | SYSTOLIC BLOOD PRESSURE: 117 MMHG

## 2019-03-07 VITALS — DIASTOLIC BLOOD PRESSURE: 75 MMHG | SYSTOLIC BLOOD PRESSURE: 106 MMHG

## 2019-03-07 VITALS — SYSTOLIC BLOOD PRESSURE: 100 MMHG | DIASTOLIC BLOOD PRESSURE: 64 MMHG

## 2019-03-07 VITALS — DIASTOLIC BLOOD PRESSURE: 80 MMHG | SYSTOLIC BLOOD PRESSURE: 122 MMHG

## 2019-03-07 VITALS — DIASTOLIC BLOOD PRESSURE: 75 MMHG | SYSTOLIC BLOOD PRESSURE: 121 MMHG

## 2019-03-07 VITALS — SYSTOLIC BLOOD PRESSURE: 110 MMHG | DIASTOLIC BLOOD PRESSURE: 71 MMHG

## 2019-03-07 VITALS — DIASTOLIC BLOOD PRESSURE: 70 MMHG | SYSTOLIC BLOOD PRESSURE: 108 MMHG

## 2019-03-07 LAB
BASOPHILS # BLD AUTO: 0.1 10^3/UL (ref 0–0.2)
BASOPHILS NFR BLD AUTO: 0.4 % (ref 0–1)
BUN SERPL-MCNC: 9 MG/DL (ref 7–18)
CALCIUM SERPL-MCNC: 8.9 MG/DL (ref 8.5–10.1)
CHLORIDE SERPL-SCNC: 104 MEQ/L (ref 98–107)
CO2 SERPL-SCNC: 25 MEQ/L (ref 21–32)
CREAT SERPL-MCNC: 0.72 MG/DL (ref 0.7–1.3)
EOSINOPHIL # BLD AUTO: 0.3 10^3/UL (ref 0–0.5)
EOSINOPHIL NFR BLD AUTO: 1.9 % (ref 0–3)
GFR SERPL CREATININE-BSD FRML MDRD: > 60 ML/MIN/{1.73_M2} (ref 60–?)
GLUCOSE SERPL-MCNC: 98 MG/DL (ref 70–100)
HCT VFR BLD AUTO: 37.2 % (ref 42–52)
HGB BLD-MCNC: 12.9 G/DL (ref 13.5–17.5)
LYMPHOCYTES # BLD AUTO: 2.4 10^3/UL (ref 1.5–4.5)
LYMPHOCYTES NFR BLD AUTO: 17.9 % (ref 24–44)
MCH RBC QN AUTO: 32.4 PG (ref 27–33)
MCHC RBC AUTO-ENTMCNC: 34.7 G/DL (ref 32–36.5)
MCV RBC AUTO: 93.5 FL (ref 80–96)
MONOCYTES # BLD AUTO: 1 10^3/UL (ref 0–0.8)
MONOCYTES NFR BLD AUTO: 7.7 % (ref 0–5)
NEUTROPHILS # BLD AUTO: 9.6 10^3/UL (ref 1.8–7.7)
NEUTROPHILS NFR BLD AUTO: 71.5 % (ref 36–66)
PLATELET # BLD AUTO: 273 10^3/UL (ref 150–450)
POTASSIUM SERPL-SCNC: 4.1 MEQ/L (ref 3.5–5.1)
RBC # BLD AUTO: 3.98 10^6/UL (ref 4.3–6.1)
SODIUM SERPL-SCNC: 136 MEQ/L (ref 136–145)
WBC # BLD AUTO: 13.4 10^3/UL (ref 4–10)

## 2019-03-07 RX ADMIN — DOCUSATE SODIUM SCH MG: 100 CAPSULE, LIQUID FILLED ORAL at 08:27

## 2019-03-07 RX ADMIN — SODIUM CHLORIDE SCH UNITS: 4.5 INJECTION, SOLUTION INTRAVENOUS at 20:34

## 2019-03-07 RX ADMIN — KETOROLAC TROMETHAMINE SCH MG: 30 INJECTION, SOLUTION INTRAMUSCULAR at 14:31

## 2019-03-07 RX ADMIN — DOCUSATE SODIUM SCH MG: 100 CAPSULE, LIQUID FILLED ORAL at 20:35

## 2019-03-07 RX ADMIN — DEXTROSE MONOHYDRATE SCH MLS/HR: 50 INJECTION, SOLUTION INTRAVENOUS at 14:31

## 2019-03-07 RX ADMIN — DEXTROSE MONOHYDRATE SCH MLS/HR: 5 INJECTION INTRAVENOUS at 12:32

## 2019-03-07 RX ADMIN — KETOROLAC TROMETHAMINE SCH MG: 30 INJECTION, SOLUTION INTRAMUSCULAR at 01:04

## 2019-03-07 RX ADMIN — KETOROLAC TROMETHAMINE SCH MG: 30 INJECTION, SOLUTION INTRAMUSCULAR at 20:35

## 2019-03-07 RX ADMIN — KETOROLAC TROMETHAMINE SCH MG: 30 INJECTION, SOLUTION INTRAMUSCULAR at 08:28

## 2019-03-07 RX ADMIN — MAGNESIUM HYDROXIDE SCH ML: 400 SUSPENSION ORAL at 08:37

## 2019-03-07 RX ADMIN — DEXTROSE MONOHYDRATE SCH MLS/HR: 5 INJECTION INTRAVENOUS at 18:30

## 2019-03-07 RX ADMIN — DEXTROSE MONOHYDRATE SCH MLS/HR: 50 INJECTION, SOLUTION INTRAVENOUS at 02:06

## 2019-03-07 RX ADMIN — SODIUM CHLORIDE SCH UNITS: 4.5 INJECTION, SOLUTION INTRAVENOUS at 08:28

## 2019-03-07 RX ADMIN — DEXTROSE MONOHYDRATE SCH MLS/HR: 5 INJECTION INTRAVENOUS at 01:04

## 2019-03-07 RX ADMIN — MAGNESIUM HYDROXIDE SCH ML: 400 SUSPENSION ORAL at 08:27

## 2019-03-07 RX ADMIN — DEXTROSE MONOHYDRATE SCH MLS/HR: 5 INJECTION INTRAVENOUS at 06:38
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
## 2019-03-07 NOTE — IPNPDOC
Subjective


General


Date/Time Seen


The patient was seen on 3/7/19 at 08:10.





Subject


Chief Complaint/History


The patient is a 48-year-old male admitted with a reason for visit of Wound 

Dehiscence. Patient is s/p I/D sternal wound. Doing well, pain controlled. 

Ambulating, tolerating diet.





Current Medications


Current Medications





Current Medications


Acetaminophen (Tylenol Tab) 650 mg Q6HP  PRN PO T > 101.5 or HA;  Start 3/4/19 

at 14:00


Acetaminophen/ Hydrocodone Bitart (Norco, Anexsia 5/325) 1 tab Q3HP  PRN PO MILD

PAIN (PS 1-4);  Start 3/4/19 at 14:00


Ampicillin Sodium/ Sulbactam Sodium 3 gm/Dextrose 100 ml @  200 mls/hr Q6H IV ; 

Start 3/6/19 at 16:00;  Stop 3/6/19 at 17:31;  Status DC


Ampicillin Sodium/ Sulbactam Sodium 3 gm/Dextrose 100 ml @  200 mls/hr Q6H IV  

Last administered on 3/7/19at 06:38;  Start 3/6/19 at 19:00


Bisacodyl (Dulcolax Suppository) 10 mg Q4HP  PRN [place] CONSTIPATION;  Start 3/4/19 

at 14:00


Cefazolin Sodium 1 gm/Dextrose 50 ml @  100 mls/hr Q8H IV  Last administered on 

3/6/19at 06:13;  Start 3/4/19 at 15:00;  Stop 3/6/19 at 07:29;  Status DC


Docusate Sodium (Colace) 100 mg BID PO  Last administered on 3/5/19at 20:27;  

Start 3/4/19 at 09:00


Fentanyl Citrate (Sublimaze) 25 mcg Q5MP  PRN IV MODERATE PAIN (PS 4-7);  Start 

3/6/19 at 15:15;  Stop 3/6/19 at 16:15;  Status DC


Heparin Sodium (Porcine) (Heparin) 5,000 units Q12H SC  Last administered on 

3/6/19at 20:14;  Start 3/4/19 at 21:00


Home Med (Med Rec Complete!)  ASDIRECTED XX ;  Start 3/4/19 at 16:00;  Stop 

3/4/19 at 16:00;  Status DC


Hydromorphone HCl (Dilaudid) 0.2 mg Q5MP  PRN IV MODERATE/SEVERE PAIN (PS 5-10);

 Start 3/6/19 at 15:15;  Stop 3/6/19 at 16:15;  Status DC


Ketorolac Tromethamine (ToRADol) 30 mg Q6H IV  Last administered on 3/7/19at 

01:04;  Start 3/4/19 at 14:00;  Stop 3/9/19 at 13:59


Lactated Ringer's 1,000 ml @  100 mls/hr Q10H IV ;  Start 3/6/19 at 15:15;  Stop

3/6/19 at 16:15;  Status DC


Magnesium Hydroxide (Milk Of Magnesia) 30 ml DAILY PO ;  Start 3/4/19 at 09:00


Metoclopramide HCl (REGLAN INJection) 10 mg Q6HP  PRN IV NAUSEA OR VOMITING;  St

art 3/6/19 at 15:15;  Stop 3/6/19 at 16:15;  Status DC


Ondansetron HCl (ZOFRAN INJection) 4 mg Q4HP  PRN IV NAUSEA;  Start 3/4/19 at 

14:00


Ondansetron HCl (ZOFRAN INJection) 4 mg Q4HP  PRN IV NAUSEA OR VOMITING;  Start 

3/6/19 at 15:15;  Stop 3/6/19 at 16:15;  Status DC


Oxycodone/ Acetaminophen (Percocet 5mg/ 325mg Tablet) 1 tab ASDIRECTED  PRN PO 

MILD/MODERATE PAIN (PS 1-7);  Start 3/6/19 at 15:15;  Stop 3/6/19 at 16:15;  

Status DC


Oxycodone/ Acetaminophen (Percocet 5mg/ 325mg Tablet) 1 tab Q4HP  PRN PO 

MODERATE PAIN (PS 5-7);  Start 3/4/19 at 14:00


Oxycodone/ Acetaminophen (Percocet 5mg/ 325mg Tablet) 2 tab Q4HP  PRN PO SEVERE 

PAIN (PS 8-10);  Start 3/4/19 at 14:00


Potassium Chloride/Dextrose/ Sod Cl 1,000 ml @  75 mls/hr [id number] IV  Last 

administered on 3/4/19at 17:04;  Start 3/4/19 at 14:00;  Stop 3/5/19 at 01:28;  

Status DC


Vancomycin HCl 1000 mg/IV Miscellaneous Supplies 1 each/ Dextrose 270 ml @  270 

mls/hr Q12H IV  Last administered on 3/7/19at 02:06;  Start 3/5/19 at 14:00





Allergies


Coded Allergies:  


     No Known Allergies (Unverified , 2/16/18)





Objective


Physical Examination


Examination


GENERAL APPEARANCE:Patient seen, laying in bed, awake, alert, and oriented. 

Comfortable, in no acute distress. Ambulated this morning


SKIN: Warm and moist. Dressing on the sternal wound with some irrigation fluid, 

no active bleeding. Minimal pain.


LUNGS: Clear to auscultation bilaterally. No wheezing appreciated.


HEART: No chest wall abnormalities. Regular rate and rhythm with no murmurs 

appreciated.


Vital Signs





Vital Signs








  Date Time  Temp Pulse Resp B/P (MAP) Pulse Ox O2 Delivery O2 Flow Rate FiO2


 


3/7/19 04:00 98.4 60 18 121/75 (90) 100   


 


3/6/19 14:50       2 


 


3/4/19 15:55      Room Air  








I&Os











I&O- Last 24 Hours up to 6 AM 


 


 3/7/19





 06:00


 


Intake Total 3830 ml


 


Output Total 3260 ml


 


Balance 570 ml











Laboratory Data


Labs 24H


Laboratory Tests 2


3/7/19 05:45: 


Immature Granulocyte % (Auto) 0.6, White Blood Count 13.4H, Red Blood Count 

3.98L, Hemoglobin 12.9L, Hematocrit 37.2L, Mean Corpuscular Volume 93.5, Mean 

Corpuscular Hemoglobin 32.4, Mean Corpuscular Hemoglobin Concent 34.7, Red Cell 

Distribution Width 13.2, Platelet Count 273, Neutrophils (%) (Auto) 71.5H, 

Lymphocytes (%) (Auto) 17.9L, Monocytes (%) (Auto) 7.7H, Eosinophils (%) (Auto) 

1.9, Basophils (%) (Auto) 0.4, Neutrophils # (Auto) 9.6H, Lymphocytes # (Auto) 

2.4, Monocytes # (Auto) 1.0H, Eosinophils # (Auto) 0.3, Basophils # (Auto) 0.1, 

Nucleated Red Blood Cells % (auto) 0.0, Anion Gap 7L, Glomerular Filtration Rate

> 60.0, Blood Urea Nitrogen 9, Creatinine 0.72, Sodium Level 136, Potassium 

Level 4.1, Chloride Level 104, Carbon Dioxide Level 25, Calcium Level 8.9


CBC/BMP


Laboratory Tests


3/7/19 05:45








Red Blood Count 3.98 L, Mean Corpuscular Volume 93.5, Mean Corpuscular 

Hemoglobin 32.4, Mean Corpuscular Hemoglobin Concent 34.7, Red Cell Distribution

Width 13.2, Neutrophils (%) (Auto) 71.5 H, Lymphocytes (%) (Auto) 17.9 L, Monoc

ytes (%) (Auto) 7.7 H, Eosinophils (%) (Auto) 1.9, Basophils (%) (Auto) 0.4, 

Neutrophils # (Auto) 9.6 H, Lymphocytes # (Auto) 2.4, Monocytes # (Auto) 1.0 H, 

Eosinophils # (Auto) 0.3, Basophils # (Auto) 0.1, Calcium Level 8.9


Microbiology





Microbiology


3/6/19 Wound Culture, Received


         Pending


3/6/19 Anaerobic Culture, Received


         Pending


3/6/19 Wound Culture, Received


         Pending


3/6/19 Anaerobic Culture, Received


         Pending


3/5/19 Gram Stain - Final, Resulted


         


3/5/19 Wound Culture, Resulted


         Pending





Impression


Sternal wound dehiscence.


S/p I&D POD 1


Stable,


continue with monitoring


Outside dressing changed, not packing under sterile conditions.


Plan to change packing tomorrow.


Cultures pending.


Will follow.





Plan / VTE


VTE Prophylaxis Ordered?:  Yes











LITA PATTON DO               Mar 7, 2019 08:14

## 2019-03-08 VITALS — SYSTOLIC BLOOD PRESSURE: 129 MMHG | DIASTOLIC BLOOD PRESSURE: 83 MMHG

## 2019-03-08 VITALS — DIASTOLIC BLOOD PRESSURE: 78 MMHG | SYSTOLIC BLOOD PRESSURE: 126 MMHG

## 2019-03-08 VITALS — SYSTOLIC BLOOD PRESSURE: 105 MMHG | DIASTOLIC BLOOD PRESSURE: 77 MMHG

## 2019-03-08 VITALS — DIASTOLIC BLOOD PRESSURE: 73 MMHG | SYSTOLIC BLOOD PRESSURE: 121 MMHG

## 2019-03-08 VITALS — DIASTOLIC BLOOD PRESSURE: 70 MMHG | SYSTOLIC BLOOD PRESSURE: 123 MMHG

## 2019-03-08 VITALS — SYSTOLIC BLOOD PRESSURE: 124 MMHG | DIASTOLIC BLOOD PRESSURE: 85 MMHG

## 2019-03-08 LAB
BASOPHILS # BLD AUTO: 0.1 10^3/UL (ref 0–0.2)
BASOPHILS NFR BLD AUTO: 0.7 % (ref 0–1)
BUN SERPL-MCNC: 14 MG/DL (ref 7–18)
CALCIUM SERPL-MCNC: 8 MG/DL (ref 8.5–10.1)
CHLORIDE SERPL-SCNC: 108 MEQ/L (ref 98–107)
CO2 SERPL-SCNC: 26 MEQ/L (ref 21–32)
CREAT SERPL-MCNC: 1.01 MG/DL (ref 0.7–1.3)
EOSINOPHIL # BLD AUTO: 0.7 10^3/UL (ref 0–0.5)
EOSINOPHIL NFR BLD AUTO: 6.3 % (ref 0–3)
GFR SERPL CREATININE-BSD FRML MDRD: > 60 ML/MIN/{1.73_M2} (ref 60–?)
GLUCOSE SERPL-MCNC: 88 MG/DL (ref 70–100)
HCT VFR BLD AUTO: 39.2 % (ref 42–52)
HGB BLD-MCNC: 13.3 G/DL (ref 13.5–17.5)
LYMPHOCYTES # BLD AUTO: 2.9 10^3/UL (ref 1.5–4.5)
LYMPHOCYTES NFR BLD AUTO: 25.5 % (ref 24–44)
MCH RBC QN AUTO: 32.8 PG (ref 27–33)
MCHC RBC AUTO-ENTMCNC: 33.9 G/DL (ref 32–36.5)
MCV RBC AUTO: 96.8 FL (ref 80–96)
MONOCYTES # BLD AUTO: 0.9 10^3/UL (ref 0–0.8)
MONOCYTES NFR BLD AUTO: 7.7 % (ref 0–5)
NEUTROPHILS # BLD AUTO: 6.6 10^3/UL (ref 1.8–7.7)
NEUTROPHILS NFR BLD AUTO: 59.2 % (ref 36–66)
PLATELET # BLD AUTO: 277 10^3/UL (ref 150–450)
POTASSIUM SERPL-SCNC: 4.2 MEQ/L (ref 3.5–5.1)
RBC # BLD AUTO: 4.05 10^6/UL (ref 4.3–6.1)
SODIUM SERPL-SCNC: 139 MEQ/L (ref 136–145)
WBC # BLD AUTO: 11.2 10^3/UL (ref 4–10)

## 2019-03-08 RX ADMIN — MAGNESIUM HYDROXIDE SCH ML: 400 SUSPENSION ORAL at 08:15

## 2019-03-08 RX ADMIN — DOCUSATE SODIUM SCH MG: 100 CAPSULE, LIQUID FILLED ORAL at 20:54

## 2019-03-08 RX ADMIN — KETOROLAC TROMETHAMINE SCH MG: 30 INJECTION, SOLUTION INTRAMUSCULAR at 14:00

## 2019-03-08 RX ADMIN — SODIUM CHLORIDE SCH UNITS: 4.5 INJECTION, SOLUTION INTRAVENOUS at 08:15

## 2019-03-08 RX ADMIN — DEXTROSE MONOHYDRATE SCH MLS/HR: 5 INJECTION INTRAVENOUS at 12:22

## 2019-03-08 RX ADMIN — DEXTROSE MONOHYDRATE SCH MLS/HR: 5 INJECTION INTRAVENOUS at 06:37

## 2019-03-08 RX ADMIN — KETOROLAC TROMETHAMINE SCH MG: 30 INJECTION, SOLUTION INTRAMUSCULAR at 08:15

## 2019-03-08 RX ADMIN — DOCUSATE SODIUM SCH MG: 100 CAPSULE, LIQUID FILLED ORAL at 08:14

## 2019-03-08 RX ADMIN — KETOROLAC TROMETHAMINE SCH MG: 30 INJECTION, SOLUTION INTRAMUSCULAR at 01:30

## 2019-03-08 RX ADMIN — DEXTROSE MONOHYDRATE SCH MLS/HR: 50 INJECTION, SOLUTION INTRAVENOUS at 14:00

## 2019-03-08 RX ADMIN — SODIUM CHLORIDE SCH UNITS: 4.5 INJECTION, SOLUTION INTRAVENOUS at 20:54

## 2019-03-08 RX ADMIN — KETOROLAC TROMETHAMINE SCH MG: 30 INJECTION, SOLUTION INTRAMUSCULAR at 20:53

## 2019-03-08 RX ADMIN — DEXTROSE MONOHYDRATE SCH MLS/HR: 5 INJECTION INTRAVENOUS at 00:33

## 2019-03-08 RX ADMIN — DEXTROSE MONOHYDRATE SCH MLS/HR: 50 INJECTION, SOLUTION INTRAVENOUS at 01:30
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
## 2019-03-08 NOTE — IPNPDOC
Subjective


General


Date/Time Seen


The patient was seen on 3/8/19 at 08:02.





Subject


Chief Complaint/History


The patient is a 48-year-old male admitted with a reason for visit of Wound 

Dehiscence. No events overnight. No complains.





Current Medications


Current Medications





Current Medications


Acetaminophen (Tylenol Tab) 650 mg Q6HP  PRN PO T > 101.5 or HA;  Start 3/4/19 

at 14:00


Acetaminophen/ Hydrocodone Bitart (Norco, Anexsia 5/325) 1 tab Q3HP  PRN PO MILD

PAIN (PS 1-4);  Start 3/4/19 at 14:00


Ampicillin Sodium/ Sulbactam Sodium 3 gm/Dextrose 100 ml @  200 mls/hr Q6H IV ; 

Start 3/6/19 at 16:00;  Stop 3/6/19 at 17:31;  Status DC


Ampicillin Sodium/ Sulbactam Sodium 3 gm/Dextrose 100 ml @  200 mls/hr Q6H IV  

Last administered on 3/8/19at 06:37;  Start 3/6/19 at 19:00


Bisacodyl (Dulcolax Suppository) 10 mg Q4HP  PRN [place] CONSTIPATION;  Start 3/4/19 

at 14:00


Cefazolin Sodium 1 gm/Dextrose 50 ml @  100 mls/hr Q8H IV  Last administered on 

3/6/19at 06:13;  Start 3/4/19 at 15:00;  Stop 3/6/19 at 07:29;  Status DC


Docusate Sodium (Colace) 100 mg BID PO  Last administered on 3/7/19at 20:35;  

Start 3/4/19 at 09:00


Fentanyl Citrate (Sublimaze) 25 mcg Q5MP  PRN IV MODERATE PAIN (PS 4-7);  Start 

3/6/19 at 15:15;  Stop 3/6/19 at 16:15;  Status DC


Heparin Sodium (Porcine) (Heparin) 5,000 units Q12H SC  Last administered on 

3/7/19at 20:34;  Start 3/4/19 at 21:00


Home Med (Med Rec Complete!)  ASDIRECTED XX ;  Start 3/4/19 at 16:00;  Stop 

3/4/19 at 16:00;  Status DC


Hydromorphone HCl (Dilaudid) 0.2 mg Q5MP  PRN IV MODERATE/SEVERE PAIN (PS 5-10);

 Start 3/6/19 at 15:15;  Stop 3/6/19 at 16:15;  Status DC


Ketorolac Tromethamine (ToRADol) 30 mg Q6H IV  Last administered on 3/8/19at 

01:30;  Start 3/4/19 at 14:00;  Stop 3/9/19 at 13:59


Lactated Ringer's 1,000 ml @  100 mls/hr Q10H IV ;  Start 3/6/19 at 15:15;  Stop

3/6/19 at 16:15;  Status DC


Magnesium Hydroxide (Milk Of Magnesia) 30 ml DAILY PO ;  Start 3/4/19 at 09:00


Metoclopramide HCl (REGLAN INJection) 10 mg Q6HP  PRN IV NAUSEA OR VOMITING;  

Start 3/6/19 at 15:15;  Stop 3/6/19 at 16:15;  Status DC


Ondansetron HCl (ZOFRAN INJection) 4 mg Q4HP  PRN IV NAUSEA;  Start 3/4/19 at 

14:00


Ondansetron HCl (ZOFRAN INJection) 4 mg Q4HP  PRN IV NAUSEA OR VOMITING;  Start 

3/6/19 at 15:15;  Stop 3/6/19 at 16:15;  Status DC


Oxycodone/ Acetaminophen (Percocet 5mg/ 325mg Tablet) 1 tab ASDIRECTED  PRN PO 

MILD/MODERATE PAIN (PS 1-7);  Start 3/6/19 at 15:15;  Stop 3/6/19 at 16:15;  

Status DC


Oxycodone/ Acetaminophen (Percocet 5mg/ 325mg Tablet) 1 tab Q4HP  PRN PO 

MODERATE PAIN (PS 5-7);  Start 3/4/19 at 14:00


Oxycodone/ Acetaminophen (Percocet 5mg/ 325mg Tablet) 2 tab Q4HP  PRN PO SEVERE 

PAIN (PS 8-10);  Start 3/4/19 at 14:00


Potassium Chloride/Dextrose/ Sod Cl 1,000 ml @  75 mls/hr [id number] IV  Last 

administered on 3/4/19at 17:04;  Start 3/4/19 at 14:00;  Stop 3/5/19 at 01:28;  

Status DC


Vancomycin HCl 1000 mg/IV Miscellaneous Supplies 1 each/ Dextrose 270 ml @  270 

mls/hr Q12H IV  Last administered on 3/8/19at 01:30;  Start 3/5/19 at 14:00





Allergies


Coded Allergies:  


     No Known Allergies (Unverified , 2/16/18)





Objective


Physical Examination


Examination


GENERAL APPEARANCE:Patient seen, laying in bed, awake, alert, and oriented. 

Comfortable, in no acute distress. Ambulated earlier.


SKIN: Warm and moist. Lower sternal wound with pink edges, 3 stay sutures 

inferior wound intact. No purulence. Serous drainage moderate. No odor.


HEENT: Normocephalic,  atraumatic. Pink palpebral conjunctiva, anicteric 

sclerae. Lips and mucosa appear moist.


NECK: Supple, no thyromegaly. No obvious jugular venous distention.


LUNGS: Clear to auscultation bilaterally. No wheezing appreciated.


HEART: No chest wall abnormalities. Regular rate and rhythm with no murmurs 

appreciated.


Vital Signs





Vital Signs








  Date Time  Temp Pulse Resp B/P (MAP) Pulse Ox O2 Delivery O2 Flow Rate FiO2


 


3/8/19 07:58 98.5 64 18 105/77 (86) 96   


 


3/6/19 14:50       2 


 


3/4/19 15:55      Room Air  








I&Os











I&O- Last 24 Hours up to 6 AM 


 


 3/8/19





 06:00


 


Intake Total 2250 ml


 


Output Total 1525 ml


 


Balance 725 ml











Laboratory Data


Labs 24H


Laboratory Tests 2


3/7/19 12:44: Vancomycin Level Trough 12.0


3/8/19 05:24: 


Immature Granulocyte % (Auto) 0.6, White Blood Count 11.2H, Red Blood Count 

4.05L, Hemoglobin 13.3L, Hematocrit 39.2L, Mean Corpuscular Volume 96.8H, Mean 

Corpuscular Hemoglobin 32.8, Mean Corpuscular Hemoglobin Concent 33.9, Red Cell 

Distribution Width 13.7, Platelet Count 277, Neutrophils (%) (Auto) 59.2, 

Lymphocytes (%) (Auto) 25.5, Monocytes (%) (Auto) 7.7H, Eosinophils (%) (Auto) 

6.3H, Basophils (%) (Auto) 0.7, Neutrophils # (Auto) 6.6, Lymphocytes # (Auto) 

2.9, Monocytes # (Auto) 0.9H, Eosinophils # (Auto) 0.7H, Basophils # (Auto) 0.1,

Nucleated Red Blood Cells % (auto) 0.0, Anion Gap 5L, Glomerular Filtration Rate

> 60.0, Blood Urea Nitrogen 14#, Creatinine 1.01, Sodium Level 139, Potassium 

Level 4.2, Chloride Level 108H, Carbon Dioxide Level 26, Calcium Level 8.0L


CBC/BMP


Laboratory Tests


3/8/19 05:24








Red Blood Count 4.05 L, Mean Corpuscular Volume 96.8 H, Mean Corpuscular 

Hemoglobin 32.8, Mean Corpuscular Hemoglobin Concent 33.9, Red Cell Distribution

Width 13.7, Neutrophils (%) (Auto) 59.2, Lymphocytes (%) (Auto) 25.5, Monocytes 

(%) (Auto) 7.7 H, Eosinophils (%) (Auto) 6.3 H, Basophils (%) (Auto) 0.7, N

eutrophils # (Auto) 6.6, Lymphocytes # (Auto) 2.9, Monocytes # (Auto) 0.9 H, 

Eosinophils # (Auto) 0.7 H, Basophils # (Auto) 0.1, Calcium Level 8.0 L


Microbiology





Microbiology


3/6/19 Wound Culture, Received


         Pending


3/6/19 Anaerobic Culture, Received


         Pending


3/6/19 Wound Culture, Received


         Pending


3/6/19 Anaerobic Culture, Received


         Pending


3/5/19 Gram Stain - Final, Complete


         


3/5/19 Wound Culture - Final, Complete


         Staphylococcus Sp Coag Neg





Impression


Lower sternal wound.


Cultured pending, 


continue with antibiotics


Wound dressing changed today under sterile conditions. Endofoam and Hydrofera 

blue dressing applied. Patient tolerated well.


Next wound dressing change Monday.


Discussed with patient high likelihood secondary closure procedure when 

infection clears. Patient states understanding. 


Will follow patient.





Plan / VTE


VTE Prophylaxis Ordered?:  Yes











LITA PATTON DO               Mar 8, 2019 08:06
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
## 2019-03-08 NOTE — IPN
DATE:  03/08/2019

 

The patient has been doing well, but did develop a little bit of a temperature

spike today.  His white count has been coming down after he was debrided.  A few

days ago his white count went up and now is trending back down.  Probably had

some bacteremia associated with the debridement and at this point seems

relatively stable.  He had staphylococcus in his wound as expected and is being

appropriately treated for this.  He continues to have dressing change as per Dr. Galvan and Dr. Person has made recommendations concerning antibiotic treatment

concerning this.  His dressing is intact.  Otherwise not complaining of any

shortness of breath.  No other GI or  symptoms.

 

IMPRESSION AND PLAN

The patient is here for ongoing antibiotic treatment and continued wound care by

Dr. Galvan. I appreciate Dr. Person's input.  We will continue him on the dressing

changes and will see how he does over the next several days, next week.  However,

at this point it sounds as though Dr. Galvan and Dr. Macdonald may decide to

progress to operative intervention in approximately a week and half and proceed

with possible flaps closure of the site.  Otherwise will continue him on

antibiotics and dressing changes until that time.
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
## 2019-03-09 VITALS — SYSTOLIC BLOOD PRESSURE: 125 MMHG | DIASTOLIC BLOOD PRESSURE: 82 MMHG

## 2019-03-09 VITALS — SYSTOLIC BLOOD PRESSURE: 118 MMHG | DIASTOLIC BLOOD PRESSURE: 75 MMHG

## 2019-03-09 VITALS — DIASTOLIC BLOOD PRESSURE: 84 MMHG | SYSTOLIC BLOOD PRESSURE: 143 MMHG

## 2019-03-09 VITALS — SYSTOLIC BLOOD PRESSURE: 115 MMHG | DIASTOLIC BLOOD PRESSURE: 67 MMHG

## 2019-03-09 VITALS — SYSTOLIC BLOOD PRESSURE: 134 MMHG | DIASTOLIC BLOOD PRESSURE: 82 MMHG

## 2019-03-09 VITALS — SYSTOLIC BLOOD PRESSURE: 115 MMHG | DIASTOLIC BLOOD PRESSURE: 73 MMHG

## 2019-03-09 LAB
BASOPHILS # BLD AUTO: 0.1 10^3/UL (ref 0–0.2)
BASOPHILS NFR BLD AUTO: 0.6 % (ref 0–1)
BUN SERPL-MCNC: 15 MG/DL (ref 7–18)
CALCIUM SERPL-MCNC: 7.9 MG/DL (ref 8.5–10.1)
CHLORIDE SERPL-SCNC: 109 MEQ/L (ref 98–107)
CO2 SERPL-SCNC: 28 MEQ/L (ref 21–32)
CREAT SERPL-MCNC: 0.87 MG/DL (ref 0.7–1.3)
EOSINOPHIL # BLD AUTO: 0.9 10^3/UL (ref 0–0.5)
EOSINOPHIL NFR BLD AUTO: 7.6 % (ref 0–3)
GFR SERPL CREATININE-BSD FRML MDRD: > 60 ML/MIN/{1.73_M2} (ref 60–?)
GLUCOSE SERPL-MCNC: 87 MG/DL (ref 70–100)
HCT VFR BLD AUTO: 36 % (ref 42–52)
HGB BLD-MCNC: 12.4 G/DL (ref 13.5–17.5)
LYMPHOCYTES # BLD AUTO: 3.2 10^3/UL (ref 1.5–4.5)
LYMPHOCYTES NFR BLD AUTO: 26.3 % (ref 24–44)
MCH RBC QN AUTO: 33 PG (ref 27–33)
MCHC RBC AUTO-ENTMCNC: 34.4 G/DL (ref 32–36.5)
MCV RBC AUTO: 95.7 FL (ref 80–96)
MONOCYTES # BLD AUTO: 1.1 10^3/UL (ref 0–0.8)
MONOCYTES NFR BLD AUTO: 9.3 % (ref 0–5)
NEUTROPHILS # BLD AUTO: 6.7 10^3/UL (ref 1.8–7.7)
NEUTROPHILS NFR BLD AUTO: 55.6 % (ref 36–66)
PLATELET # BLD AUTO: 280 10^3/UL (ref 150–450)
POTASSIUM SERPL-SCNC: 4.2 MEQ/L (ref 3.5–5.1)
RBC # BLD AUTO: 3.76 10^6/UL (ref 4.3–6.1)
SODIUM SERPL-SCNC: 140 MEQ/L (ref 136–145)
WBC # BLD AUTO: 12.1 10^3/UL (ref 4–10)

## 2019-03-09 RX ADMIN — DEXTROSE MONOHYDRATE SCH MLS/HR: 50 INJECTION, SOLUTION INTRAVENOUS at 01:54

## 2019-03-09 RX ADMIN — SODIUM CHLORIDE SCH UNITS: 4.5 INJECTION, SOLUTION INTRAVENOUS at 08:49

## 2019-03-09 RX ADMIN — MAGNESIUM HYDROXIDE SCH ML: 400 SUSPENSION ORAL at 08:50

## 2019-03-09 RX ADMIN — KETOROLAC TROMETHAMINE SCH MG: 30 INJECTION, SOLUTION INTRAMUSCULAR at 01:53

## 2019-03-09 RX ADMIN — DOCUSATE SODIUM SCH MG: 100 CAPSULE, LIQUID FILLED ORAL at 08:49

## 2019-03-09 RX ADMIN — DEXTROSE MONOHYDRATE SCH MLS/HR: 50 INJECTION, SOLUTION INTRAVENOUS at 13:38

## 2019-03-09 RX ADMIN — SODIUM CHLORIDE SCH UNITS: 4.5 INJECTION, SOLUTION INTRAVENOUS at 21:26

## 2019-03-09 RX ADMIN — KETOROLAC TROMETHAMINE SCH MG: 30 INJECTION, SOLUTION INTRAMUSCULAR at 08:00

## 2019-03-09 RX ADMIN — DOCUSATE SODIUM SCH MG: 100 CAPSULE, LIQUID FILLED ORAL at 21:26

## 2019-03-09 NOTE — REP
PA and lateral chest:

Comparisons are 03/07/2019 and 03/08/2019.

There are bilateral pleural effusions, unchanged.

There is no pneumothorax.

Lung fields otherwise clear.

Cardiac size is normal.  The bekah, mediastinum, skeletal structures are

unchanged.

No pneumomediastinum or pneumothorax.

Impression:

Bilateral pleural effusions.

No interval change.

 

 

Electronically Signed by

Cristian Gaston MD 03/09/2019 08:13 A

## 2019-03-09 NOTE — IPN
DATE:  03/09/2019

 

Patient overnight has been doing well without any specific complaints.  No

significant chest pain or discomfort. His temperature was up little bit yesterday

afternoon 99.3, but it is back down, and does have a slightly elevated white

count of 12,000.

 

Otherwise, on his physical exam, his dressing is clean and dry.  There is no

significant drainage from the site, and overall appears to be adequate at this

time.

 

IMPRESSION/PLAN:

Patient continues with an open chest wound/dehiscence of an upper

midline/subxiphoid incision and will continue with dressing changes as per

plastics, and Dr. Person should be back on Monday to make additional

recommendations concerning antibiotic changes.  Otherwise, continue with current

treatment.

## 2019-03-10 VITALS — SYSTOLIC BLOOD PRESSURE: 129 MMHG | DIASTOLIC BLOOD PRESSURE: 78 MMHG

## 2019-03-10 VITALS — DIASTOLIC BLOOD PRESSURE: 77 MMHG | SYSTOLIC BLOOD PRESSURE: 116 MMHG

## 2019-03-10 VITALS — DIASTOLIC BLOOD PRESSURE: 90 MMHG | SYSTOLIC BLOOD PRESSURE: 132 MMHG

## 2019-03-10 VITALS — SYSTOLIC BLOOD PRESSURE: 121 MMHG | DIASTOLIC BLOOD PRESSURE: 79 MMHG

## 2019-03-10 VITALS — SYSTOLIC BLOOD PRESSURE: 120 MMHG | DIASTOLIC BLOOD PRESSURE: 79 MMHG

## 2019-03-10 VITALS — DIASTOLIC BLOOD PRESSURE: 73 MMHG | SYSTOLIC BLOOD PRESSURE: 117 MMHG

## 2019-03-10 LAB
BASOPHILS # BLD AUTO: 0.1 10^3/UL (ref 0–0.2)
BASOPHILS NFR BLD AUTO: 0.9 % (ref 0–1)
BUN SERPL-MCNC: 14 MG/DL (ref 7–18)
CALCIUM SERPL-MCNC: 8.7 MG/DL (ref 8.5–10.1)
CHLORIDE SERPL-SCNC: 107 MEQ/L (ref 98–107)
CO2 SERPL-SCNC: 27 MEQ/L (ref 21–32)
CREAT SERPL-MCNC: 1.14 MG/DL (ref 0.7–1.3)
EOSINOPHIL # BLD AUTO: 1 10^3/UL (ref 0–0.5)
EOSINOPHIL NFR BLD AUTO: 9.3 % (ref 0–3)
GFR SERPL CREATININE-BSD FRML MDRD: > 60 ML/MIN/{1.73_M2} (ref 60–?)
GLUCOSE SERPL-MCNC: 87 MG/DL (ref 70–100)
HCT VFR BLD AUTO: 39.7 % (ref 42–52)
HGB BLD-MCNC: 13.3 G/DL (ref 13.5–17.5)
LYMPHOCYTES # BLD AUTO: 2.4 10^3/UL (ref 1.5–4.5)
LYMPHOCYTES NFR BLD AUTO: 22.5 % (ref 24–44)
MCH RBC QN AUTO: 32.8 PG (ref 27–33)
MCHC RBC AUTO-ENTMCNC: 33.5 G/DL (ref 32–36.5)
MCV RBC AUTO: 98 FL (ref 80–96)
MONOCYTES # BLD AUTO: 1.1 10^3/UL (ref 0–0.8)
MONOCYTES NFR BLD AUTO: 10.6 % (ref 0–5)
NEUTROPHILS # BLD AUTO: 5.9 10^3/UL (ref 1.8–7.7)
NEUTROPHILS NFR BLD AUTO: 55.8 % (ref 36–66)
PLATELET # BLD AUTO: 278 10^3/UL (ref 150–450)
POTASSIUM SERPL-SCNC: 4.4 MEQ/L (ref 3.5–5.1)
RBC # BLD AUTO: 4.05 10^6/UL (ref 4.3–6.1)
SODIUM SERPL-SCNC: 140 MEQ/L (ref 136–145)
WBC # BLD AUTO: 10.5 10^3/UL (ref 4–10)

## 2019-03-10 RX ADMIN — SODIUM CHLORIDE SCH UNITS: 4.5 INJECTION, SOLUTION INTRAVENOUS at 20:08

## 2019-03-10 RX ADMIN — SODIUM CHLORIDE SCH UNITS: 4.5 INJECTION, SOLUTION INTRAVENOUS at 08:54

## 2019-03-10 RX ADMIN — MAGNESIUM HYDROXIDE SCH ML: 400 SUSPENSION ORAL at 08:54

## 2019-03-10 RX ADMIN — DEXTROSE MONOHYDRATE SCH MLS/HR: 50 INJECTION, SOLUTION INTRAVENOUS at 14:00

## 2019-03-10 RX ADMIN — DEXTROSE MONOHYDRATE SCH MLS/HR: 50 INJECTION, SOLUTION INTRAVENOUS at 01:49

## 2019-03-10 RX ADMIN — DOCUSATE SODIUM SCH MG: 100 CAPSULE, LIQUID FILLED ORAL at 08:54

## 2019-03-10 RX ADMIN — DOCUSATE SODIUM SCH MG: 100 CAPSULE, LIQUID FILLED ORAL at 20:08

## 2019-03-10 NOTE — REP
PA and lateral chest:

Comparison is 03/09/1929.  The bilateral pleural effusions are unchanged.

There is no pneumothorax or pneumomediastinum.

The lung fields are clear and unchanged.  Cardiac size is normal, unchanged.  The

bekah, mediastinum, skeletal structures are unremarkable and unchanged.

Impression:

No interval change.

 

 

Electronically Signed by

Cristian Gaston MD 03/10/2019 08:36 A

## 2019-03-11 VITALS — DIASTOLIC BLOOD PRESSURE: 82 MMHG | SYSTOLIC BLOOD PRESSURE: 121 MMHG

## 2019-03-11 VITALS — SYSTOLIC BLOOD PRESSURE: 107 MMHG | DIASTOLIC BLOOD PRESSURE: 73 MMHG

## 2019-03-11 VITALS — DIASTOLIC BLOOD PRESSURE: 78 MMHG | SYSTOLIC BLOOD PRESSURE: 122 MMHG

## 2019-03-11 VITALS — SYSTOLIC BLOOD PRESSURE: 117 MMHG | DIASTOLIC BLOOD PRESSURE: 83 MMHG

## 2019-03-11 VITALS — DIASTOLIC BLOOD PRESSURE: 74 MMHG | SYSTOLIC BLOOD PRESSURE: 121 MMHG

## 2019-03-11 LAB
BUN SERPL-MCNC: 11 MG/DL (ref 7–18)
CALCIUM SERPL-MCNC: 8.7 MG/DL (ref 8.5–10.1)
CHLORIDE SERPL-SCNC: 106 MEQ/L (ref 98–107)
CO2 SERPL-SCNC: 27 MEQ/L (ref 21–32)
CREAT SERPL-MCNC: 0.93 MG/DL (ref 0.7–1.3)
GFR SERPL CREATININE-BSD FRML MDRD: > 60 ML/MIN/{1.73_M2} (ref 60–?)
GLUCOSE SERPL-MCNC: 82 MG/DL (ref 70–100)
POTASSIUM SERPL-SCNC: 4.4 MEQ/L (ref 3.5–5.1)
PREALB SERPL-MCNC: 23.6 MG/DL (ref 20–40)
SODIUM SERPL-SCNC: 137 MEQ/L (ref 136–145)

## 2019-03-11 RX ADMIN — DEXTROSE MONOHYDRATE SCH MLS/HR: 50 INJECTION, SOLUTION INTRAVENOUS at 15:00

## 2019-03-11 RX ADMIN — MAGNESIUM HYDROXIDE SCH ML: 400 SUSPENSION ORAL at 09:00

## 2019-03-11 RX ADMIN — DOCUSATE SODIUM SCH MG: 100 CAPSULE, LIQUID FILLED ORAL at 09:00

## 2019-03-11 RX ADMIN — SODIUM CHLORIDE, PRESERVATIVE FREE SCH ML: 5 INJECTION INTRAVENOUS at 15:01

## 2019-03-11 RX ADMIN — SODIUM CHLORIDE SCH UNITS: 4.5 INJECTION, SOLUTION INTRAVENOUS at 10:15

## 2019-03-11 RX ADMIN — SODIUM CHLORIDE SCH UNITS: 4.5 INJECTION, SOLUTION INTRAVENOUS at 20:15

## 2019-03-11 RX ADMIN — DOCUSATE SODIUM SCH MG: 100 CAPSULE, LIQUID FILLED ORAL at 20:15

## 2019-03-11 RX ADMIN — DEXTROSE MONOHYDRATE SCH MLS/HR: 50 INJECTION, SOLUTION INTRAVENOUS at 02:00

## 2019-03-11 RX ADMIN — SODIUM CHLORIDE, PRESERVATIVE FREE SCH ML: 5 INJECTION INTRAVENOUS at 20:15

## 2019-03-11 NOTE — IPNPDOC
Subjective


General


Date/Time Seen


The patient was seen on 3/11/19 at 8:30am. No new complains.





Subject


Chief Complaint/History


The patient is a 48-year-old male admitted with a reason for visit of Wound 

Dehiscence.





Current Medications


Current Medications





Current Medications


Acetaminophen (Tylenol Tab) 650 mg Q6HP  PRN PO T > 101.5 or HA;  Start 3/4/19 

at 14:00


Acetaminophen/ Hydrocodone Bitart (Norco, Anexsia 5/325) 1 tab Q3HP  PRN PO MILD

PAIN (PS 1-4);  Start 3/4/19 at 14:00


Ampicillin Sodium/ Sulbactam Sodium 3 gm/Dextrose 100 ml @  200 mls/hr Q6H IV ; 

Start 3/6/19 at 16:00;  Stop 3/6/19 at 17:31;  Status DC


Ampicillin Sodium/ Sulbactam Sodium 3 gm/Dextrose 100 ml @  200 mls/hr Q6H IV  

Last administered on 3/8/19at 12:22;  Start 3/6/19 at 19:00;  Stop 3/8/19 at 

14:25;  Status DC


Bisacodyl (Dulcolax Suppository) 10 mg Q4HP  PRN [facility] CONSTIPATION;  Start 3/4/19 

at 14:00


Cefazolin Sodium 1 gm/Dextrose 50 ml @  100 mls/hr Q8H IV  Last administered on 

3/6/19at 06:13;  Start 3/4/19 at 15:00;  Stop 3/6/19 at 07:29;  Status DC


Docusate Sodium (Colace) 100 mg BID PO  Last administered on 3/10/19at 20:08;  

Start 3/4/19 at 09:00


Fentanyl Citrate (Sublimaze) 25 mcg Q5MP  PRN IV MODERATE PAIN (PS 4-7);  Start 

3/6/19 at 15:15;  Stop 3/6/19 at 16:15;  Status DC


Heparin Sodium (Porcine) (Heparin) 5,000 units Q12H SC  Last administered on 

3/11/19at 10:15;  Start 3/4/19 at 21:00


Home Med (Med Rec Complete!)  ASDIRECTED XX ;  Start 3/4/19 at 16:00;  Stop 

3/4/19 at 16:00;  Status DC


Hydromorphone HCl (Dilaudid) 0.2 mg Q5MP  PRN IV MODERATE/SEVERE PAIN (PS 5-10);

 Start 3/6/19 at 15:15;  Stop 3/6/19 at 16:15;  Status DC


Ketorolac Tromethamine (ToRADol) 30 mg Q6H IV  Last administered on 3/9/19at 

08:00;  Start 3/4/19 at 14:00;  Stop 3/9/19 at 13:59;  Status DC


Lactated Ringer's 1,000 ml @  100 mls/hr Q10H IV ;  Start 3/6/19 at 15:15;  Stop

3/6/19 at 16:15;  Status DC


Magnesium Hydroxide (Milk Of Magnesia) 30 ml DAILY PO ;  Start 3/4/19 at 09:00


Metoclopramide HCl (REGLAN INJection) 10 mg Q6HP  PRN IV NAUSEA OR VOMITING;  

Start 3/6/19 at 15:15;  Stop 3/6/19 at 16:15;  Status DC


Miscellaneous (Unresolved Clarification Entry) SEE LABEL COMMENTS DAILY XX ;  

Start 3/10/19 at 09:00;  Stop 3/10/19 at 14:57;  Status DC


Ondansetron HCl (ZOFRAN INJection) 4 mg Q4HP  PRN IV NAUSEA;  Start 3/4/19 at 

14:00


Ondansetron HCl (ZOFRAN INJection) 4 mg Q4HP  PRN IV NAUSEA OR VOMITING;  Start 

3/6/19 at 15:15;  Stop 3/6/19 at 16:15;  Status DC


Oxycodone/ Acetaminophen (Percocet 5mg/ 325mg Tablet) 1 tab ASDIRECTED  PRN PO 

MILD/MODERATE PAIN (PS 1-7);  Start 3/6/19 at 15:15;  Stop 3/6/19 at 16:15;  

Status DC


Oxycodone/ Acetaminophen (Percocet 5mg/ 325mg Tablet) 1 tab Q4HP  PRN PO 

MODERATE PAIN (PS 5-7);  Start 3/4/19 at 14:00


Oxycodone/ Acetaminophen (Percocet 5mg/ 325mg Tablet) 2 tab Q4HP  PRN PO SEVERE 

PAIN (PS 8-10);  Start 3/4/19 at 14:00


Potassium Chloride/Dextrose/ Sod Cl 1,000 ml @  75 mls/hr [id number] IV  Last 

administered on 3/4/19at 17:04;  Start 3/4/19 at 14:00;  Stop 3/5/19 at 01:28;  

Status DC


Sodium Chloride (Saline Lock Flush) 2 ml ASDIRECTED  PRN IV SEE LABEL COMMENTS; 

Start 3/11/19 at 12:15


Sodium Chloride (Saline Lock Flush) 2 ml SLF IV  Last administered on 3/11/19at 

15:01;  Start 3/11/19 at 14:00


Vancomycin HCl 1000 mg/IV Miscellaneous Supplies 1 each/ Dextrose 270 ml @  270 

mls/hr Q12H IV  Last administered on 3/11/19at 15:00;  Start 3/5/19 at 14:00





Allergies


Coded Allergies:  


     No Known Allergies (Unverified , 2/16/18)





Objective


Physical Examination


Examination


GENERAL APPEARANCE:NAD, ambulating, comfortable.


SKIN: Sternal wound: Inferior part of the wound with sutures in place. Healing. 

Upper part of the wound with clean granulating tissue, no cellulitis. 

Pericardium visible with clean granulation. Moderate serous drainage. No active 

bleeding. 


LUNGS: Clear to auscultation bilaterally. No wheezing appreciated.


HEART: No chest wall abnormalities. Regular rate and rhythm with no murmurs 

appreciated.


Vital Signs





Vital Signs








  Date Time  Temp Pulse Resp B/P (MAP) Pulse Ox O2 Delivery O2 Flow Rate FiO2


 


3/11/19 12:00 98.8 73 18 122/78 (93) 96   


 


3/6/19 14:50       2 








I&Os











I&O- Last 24 Hours up to 6 AM 


 


 3/11/19





 06:00


 


Intake Total 1950 ml


 


Output Total 4750 ml


 


Balance -2800 ml











Laboratory Data


Labs 24H


Laboratory Tests 2


3/11/19 05:06: 


Anion Gap 4L, Glomerular Filtration Rate > 60.0, Blood Urea Nitrogen 11, 

Creatinine 0.93, Sodium Level 137, Potassium Level 4.4, Chloride Level 106, 

Carbon Dioxide Level 27, Calcium Level 8.7, Prealbumin 23.6


CBC/BMP


Laboratory Tests


3/11/19 05:06








Calcium Level 8.7


Microbiology





Microbiology


3/6/19 Wound Culture - Final, Resulted


         


3/6/19 Anaerobic Culture - Final, Resulted


         


3/6/19 Wound Culture - Final, Resulted


         


3/6/19 Anaerobic Culture - Final, Resulted


         


3/5/19 Gram Stain - Final, Complete


         


3/5/19 Wound Culture - Final, Complete


         Staphylococcus Sp Coag Neg





Impression


Open wound inferior sternum


Granulating well.


Will continue with dressing changes with Endofoam and Hydrofera blue. 


Prealbumin levels. 


Continue with antibiotics.


WBC coming down. 


Findings discussed with patient.


Improving.





Plan / VTE


VTE Prophylaxis Ordered?:  Yes











LITA PATTON DO              Mar 11, 2019 16:39

## 2019-03-12 VITALS — DIASTOLIC BLOOD PRESSURE: 57 MMHG | SYSTOLIC BLOOD PRESSURE: 105 MMHG

## 2019-03-12 VITALS — SYSTOLIC BLOOD PRESSURE: 108 MMHG | DIASTOLIC BLOOD PRESSURE: 69 MMHG

## 2019-03-12 VITALS — SYSTOLIC BLOOD PRESSURE: 110 MMHG | DIASTOLIC BLOOD PRESSURE: 75 MMHG

## 2019-03-12 VITALS — DIASTOLIC BLOOD PRESSURE: 57 MMHG | SYSTOLIC BLOOD PRESSURE: 114 MMHG

## 2019-03-12 VITALS — SYSTOLIC BLOOD PRESSURE: 115 MMHG | DIASTOLIC BLOOD PRESSURE: 62 MMHG

## 2019-03-12 VITALS — SYSTOLIC BLOOD PRESSURE: 104 MMHG | DIASTOLIC BLOOD PRESSURE: 65 MMHG

## 2019-03-12 LAB
BUN SERPL-MCNC: 14 MG/DL (ref 7–18)
CALCIUM SERPL-MCNC: 8.7 MG/DL (ref 8.5–10.1)
CHLORIDE SERPL-SCNC: 108 MEQ/L (ref 98–107)
CO2 SERPL-SCNC: 26 MEQ/L (ref 21–32)
CREAT SERPL-MCNC: 0.98 MG/DL (ref 0.7–1.3)
GFR SERPL CREATININE-BSD FRML MDRD: > 60 ML/MIN/{1.73_M2} (ref 60–?)
GLUCOSE SERPL-MCNC: 87 MG/DL (ref 70–100)
POTASSIUM SERPL-SCNC: 4.3 MEQ/L (ref 3.5–5.1)
SODIUM SERPL-SCNC: 140 MEQ/L (ref 136–145)

## 2019-03-12 RX ADMIN — SODIUM CHLORIDE SCH UNITS: 4.5 INJECTION, SOLUTION INTRAVENOUS at 21:10

## 2019-03-12 RX ADMIN — DOCUSATE SODIUM SCH MG: 100 CAPSULE, LIQUID FILLED ORAL at 21:10

## 2019-03-12 RX ADMIN — SODIUM CHLORIDE, PRESERVATIVE FREE SCH ML: 5 INJECTION INTRAVENOUS at 21:10

## 2019-03-12 RX ADMIN — DOCUSATE SODIUM SCH MG: 100 CAPSULE, LIQUID FILLED ORAL at 08:29

## 2019-03-12 RX ADMIN — SODIUM CHLORIDE, PRESERVATIVE FREE SCH ML: 5 INJECTION INTRAVENOUS at 05:31

## 2019-03-12 RX ADMIN — SODIUM CHLORIDE, PRESERVATIVE FREE SCH ML: 5 INJECTION INTRAVENOUS at 14:10

## 2019-03-12 RX ADMIN — MAGNESIUM HYDROXIDE SCH ML: 400 SUSPENSION ORAL at 08:29

## 2019-03-12 RX ADMIN — SODIUM CHLORIDE SCH UNITS: 4.5 INJECTION, SOLUTION INTRAVENOUS at 08:29

## 2019-03-12 RX ADMIN — DEXTROSE MONOHYDRATE SCH MLS/HR: 50 INJECTION, SOLUTION INTRAVENOUS at 14:10

## 2019-03-12 RX ADMIN — DEXTROSE MONOHYDRATE SCH MLS/HR: 50 INJECTION, SOLUTION INTRAVENOUS at 01:11
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
## 2019-03-12 NOTE — IPN
DATE:  03/12/2019

 

Patient is here for a wound dehiscence, and Dr. Galvan saw him yesterday for his

open wound, changed his dressing, and is continuing on antibiotics.  He has had a

temperature up to 99 but nothing spiking, and his white count was down to 10.5 a

couple days ago.  Overall, really has not had any significant drainage, and the

report is that there is good granulation tissue developing.

 

IMPRESSION AND PLAN:  The patient has an open wound that needs continued dressing

changes.  The question is whether this will be amenable to treatment without

operative intervention, and Dr. Galvan is very concerned that it will be unlikely

to heal without additional flaps, etc.  She had a long talk with the patient with

potential treatment for this and will plan on intervention when Dr. Macdonald

returns next week.  Otherwise, will continue with current treatment.
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
## 2019-03-12 NOTE — IPNPDOC
Subjective


General


Date/Time Seen


The patient was seen on 3/12/19 at 19:18.





Subject


Chief Complaint/History


The patient is a 48-year-old male admitted with a reason for visit of Wound 

Dehiscence. Patient is feeling well. No pain. Ambulating. Tolerating diet.





Current Medications


Current Medications





Current Medications


Acetaminophen (Tylenol Tab) 650 mg Q6HP  PRN PO T > 101.5 or HA;  Start 3/4/19 

at 14:00


Acetaminophen/ Hydrocodone Bitart (Norco, Anexsia 5/325) 1 tab Q3HP  PRN PO MILD

PAIN (PS 1-4);  Start 3/4/19 at 14:00


Ampicillin Sodium/ Sulbactam Sodium 3 gm/Dextrose 100 ml @  200 mls/hr Q6H IV ; 

Start 3/6/19 at 16:00;  Stop 3/6/19 at 17:31;  Status DC


Ampicillin Sodium/ Sulbactam Sodium 3 gm/Dextrose 100 ml @  200 mls/hr Q6H IV  

Last administered on 3/8/19at 12:22;  Start 3/6/19 at 19:00;  Stop 3/8/19 at 

14:25;  Status DC


Bisacodyl (Dulcolax Suppository) 10 mg Q4HP  PRN [place] CONSTIPATION;  Start 3/4/19 

at 14:00


Cefazolin Sodium 1 gm/Dextrose 50 ml @  100 mls/hr Q8H IV  Last administered on 

3/6/19at 06:13;  Start 3/4/19 at 15:00;  Stop 3/6/19 at 07:29;  Status DC


Docusate Sodium (Colace) 100 mg BID PO  Last administered on 3/11/19at 20:15;  

Start 3/4/19 at 09:00


Fentanyl Citrate (Sublimaze) 25 mcg Q5MP  PRN IV MODERATE PAIN (PS 4-7);  Start 

3/6/19 at 15:15;  Stop 3/6/19 at 16:15;  Status DC


Heparin Sodium (Porcine) (Heparin) 5,000 units Q12H SC  Last administered on 

3/12/19at 08:29;  Start 3/4/19 at 21:00


Home Med (Med Rec Complete!)  ASDIRECTED XX ;  Start 3/4/19 at 16:00;  Stop 

3/4/19 at 16:00;  Status DC


Hydromorphone HCl (Dilaudid) 0.2 mg Q5MP  PRN IV MODERATE/SEVERE PAIN (PS 5-10);

 Start 3/6/19 at 15:15;  Stop 3/6/19 at 16:15;  Status DC


Ketorolac Tromethamine (ToRADol) 30 mg Q6H IV  Last administered on 3/9/19at 

08:00;  Start 3/4/19 at 14:00;  Stop 3/9/19 at 13:59;  Status DC


Lactated Ringer's 1,000 ml @  100 mls/hr Q10H IV ;  Start 3/6/19 at 15:15;  Stop

3/6/19 at 16:15;  Status DC


Magnesium Hydroxide (Milk Of Magnesia) 30 ml DAILY PO ;  Start 3/4/19 at 09:00


Metoclopramide HCl (REGLAN INJection) 10 mg Q6HP  PRN IV NAUSEA OR VOMITING;  

Start 3/6/19 at 15:15;  Stop 3/6/19 at 16:15;  Status DC


Miscellaneous (Unresolved Clarification Entry) SEE LABEL COMMENTS DAILY XX ;  

Start 3/10/19 at 09:00;  Stop 3/10/19 at 14:57;  Status DC


Ondansetron HCl (ZOFRAN INJection) 4 mg Q4HP  PRN IV NAUSEA;  Start 3/4/19 at 

14:00


Ondansetron HCl (ZOFRAN INJection) 4 mg Q4HP  PRN IV NAUSEA OR VOMITING;  Start 

3/6/19 at 15:15;  Stop 3/6/19 at 16:15;  Status DC


Oxycodone/ Acetaminophen (Percocet 5mg/ 325mg Tablet) 1 tab ASDIRECTED  PRN PO 

MILD/MODERATE PAIN (PS 1-7);  Start 3/6/19 at 15:15;  Stop 3/6/19 at 16:15;  

Status DC


Oxycodone/ Acetaminophen (Percocet 5mg/ 325mg Tablet) 1 tab Q4HP  PRN PO 

MODERATE PAIN (PS 5-7);  Start 3/4/19 at 14:00


Oxycodone/ Acetaminophen (Percocet 5mg/ 325mg Tablet) 2 tab Q4HP  PRN PO SEVERE 

PAIN (PS 8-10);  Start 3/4/19 at 14:00


Potassium Chloride/Dextrose/ Sod Cl 1,000 ml @  75 mls/hr [id number] IV  Last 

administered on 3/4/19at 17:04;  Start 3/4/19 at 14:00;  Stop 3/5/19 at 01:28;  

Status DC


Sodium Chloride (Saline Lock Flush) 2 ml ASDIRECTED  PRN IV SEE LABEL COMMENTS; 

Start 3/11/19 at 12:15


Sodium Chloride (Saline Lock Flush) 2 ml SLF IV  Last administered on 3/12/19at 

14:10;  Start 3/11/19 at 14:00


Vancomycin HCl 1000 mg/IV Miscellaneous Supplies 1 each/ Dextrose 270 ml @  270 

mls/hr Q12H IV  Last administered on 3/12/19at 14:10;  Start 3/5/19 at 14:00





Allergies


Coded Allergies:  


     No Known Allergies (Unverified , 2/16/18)





Objective


Physical Examination


Examination


GENERAL APPEARANCE:Patient seen, laying in bed, awake, alert, and oriented. 

Comfortable, in no acute distress.


SKIN: Warm and moist. Wound is clean granulating tissue. Moderate serous 

drainage. Pericardium visible. No odor. 


LUNGS: [Clear to auscultation bilaterally. No wheezing appreciated].


HEART: [No chest wall abnormalities. Regular rate and rhythm with no murmurs 

appreciated].


Vital Signs





Vital Signs








  Date Time  Temp Pulse Resp B/P (MAP) Pulse Ox O2 Delivery O2 Flow Rate FiO2


 


3/12/19 16:00 97.4 74 19 106/64 (78) 97   


 


3/6/19 14:50       2 








I&Os











I&O- Last 24 Hours up to 6 AM 


 


 3/12/19





 06:00


 


Intake Total 2520 ml


 


Output Total 3000 ml


 


Balance -480 ml











Laboratory Data


Labs 24H


Laboratory Tests 2


3/12/19 05:39: 


Anion Gap 6L, Glomerular Filtration Rate > 60.0, Blood Urea Nitrogen 14, 

Creatinine 0.98, Sodium Level 140, Potassium Level 4.3, Chloride Level 108H, 

Carbon Dioxide Level 26, Calcium Level 8.7


3/12/19 12:47: Vancomycin Level Trough 13.0


CBC/BMP


Laboratory Tests


3/12/19 05:39








Calcium Level 8.7


Microbiology





Microbiology


3/6/19 Wound Culture - Final, Resulted


         


3/6/19 Anaerobic Culture - Final, Resulted


         


3/6/19 Wound Culture - Final, Resulted


         


3/6/19 Anaerobic Culture - Final, Resulted


         


3/5/19 Gram Stain - Final, Complete


         


3/5/19 Wound Culture - Final, Complete


         Staphylococcus Sp Coag Neg





Impression


Lower sternal wound, dehiscence.


Dressing changed to Silver Alginate/sterile gauze/foam dressing under sterile 

conditions


Future surgical intervention discussed with patient.


Risks, benefits and alternatives discussed.


Will continue with local wound care for now. Granulation increasing. 


Will follow.





Plan / VTE


VTE Prophylaxis Ordered?:  Yes











LITA PATTON DO              Mar 12, 2019 19:22

## 2019-03-13 VITALS — SYSTOLIC BLOOD PRESSURE: 116 MMHG | DIASTOLIC BLOOD PRESSURE: 72 MMHG

## 2019-03-13 VITALS — SYSTOLIC BLOOD PRESSURE: 122 MMHG | DIASTOLIC BLOOD PRESSURE: 76 MMHG

## 2019-03-13 VITALS — DIASTOLIC BLOOD PRESSURE: 61 MMHG | SYSTOLIC BLOOD PRESSURE: 100 MMHG

## 2019-03-13 VITALS — SYSTOLIC BLOOD PRESSURE: 132 MMHG | DIASTOLIC BLOOD PRESSURE: 76 MMHG

## 2019-03-13 VITALS — SYSTOLIC BLOOD PRESSURE: 115 MMHG | DIASTOLIC BLOOD PRESSURE: 77 MMHG

## 2019-03-13 VITALS — SYSTOLIC BLOOD PRESSURE: 105 MMHG | DIASTOLIC BLOOD PRESSURE: 63 MMHG

## 2019-03-13 VITALS — SYSTOLIC BLOOD PRESSURE: 100 MMHG | DIASTOLIC BLOOD PRESSURE: 60 MMHG

## 2019-03-13 LAB
BUN SERPL-MCNC: 13 MG/DL (ref 7–18)
CALCIUM SERPL-MCNC: 8.8 MG/DL (ref 8.5–10.1)
CHLORIDE SERPL-SCNC: 108 MEQ/L (ref 98–107)
CO2 SERPL-SCNC: 24 MEQ/L (ref 21–32)
CREAT SERPL-MCNC: 0.96 MG/DL (ref 0.7–1.3)
CRP SERPL-MCNC: < 0.3 MG/DL (ref 0–0.3)
GFR SERPL CREATININE-BSD FRML MDRD: > 60 ML/MIN/{1.73_M2} (ref 60–?)
GLUCOSE SERPL-MCNC: 92 MG/DL (ref 70–100)
POTASSIUM SERPL-SCNC: 4.1 MEQ/L (ref 3.5–5.1)
SODIUM SERPL-SCNC: 139 MEQ/L (ref 136–145)

## 2019-03-13 RX ADMIN — SODIUM CHLORIDE, PRESERVATIVE FREE SCH ML: 5 INJECTION INTRAVENOUS at 04:57

## 2019-03-13 RX ADMIN — DOCUSATE SODIUM SCH MG: 100 CAPSULE, LIQUID FILLED ORAL at 09:00

## 2019-03-13 RX ADMIN — SODIUM CHLORIDE SCH UNITS: 4.5 INJECTION, SOLUTION INTRAVENOUS at 20:27

## 2019-03-13 RX ADMIN — DEXTROSE MONOHYDRATE SCH MLS/HR: 50 INJECTION, SOLUTION INTRAVENOUS at 20:30

## 2019-03-13 RX ADMIN — SODIUM CHLORIDE SCH UNITS: 4.5 INJECTION, SOLUTION INTRAVENOUS at 09:20

## 2019-03-13 RX ADMIN — MAGNESIUM HYDROXIDE SCH ML: 400 SUSPENSION ORAL at 09:00

## 2019-03-13 RX ADMIN — DOCUSATE SODIUM SCH MG: 100 CAPSULE, LIQUID FILLED ORAL at 20:26

## 2019-03-13 RX ADMIN — SODIUM CHLORIDE, PRESERVATIVE FREE SCH ML: 5 INJECTION INTRAVENOUS at 20:30

## 2019-03-13 RX ADMIN — SODIUM CHLORIDE, PRESERVATIVE FREE SCH ML: 5 INJECTION INTRAVENOUS at 14:37

## 2019-03-13 RX ADMIN — DEXTROSE MONOHYDRATE SCH MLS/HR: 50 INJECTION, SOLUTION INTRAVENOUS at 02:35
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
## 2019-03-13 NOTE — IPNPDOC
Subjective


General


Date/Time Seen


The patient was seen on 3/13/19 at 16:06.





Subject


Chief Complaint/History


The patient is a 48-year-old male admitted with a reason for visit of Wound 

Dehiscence. Doing well. No new complains.





Current Medications


Current Medications





Current Medications


Acetaminophen (Tylenol Tab) 650 mg Q6HP  PRN PO T > 101.5 or HA;  Start 3/4/19 

at 14:00


Acetaminophen/ Hydrocodone Bitart (Norco, Anexsia 5/325) 1 tab Q3HP  PRN PO MILD

PAIN (PS 1-4);  Start 3/4/19 at 14:00


Ampicillin Sodium/ Sulbactam Sodium 3 gm/Dextrose 100 ml @  200 mls/hr Q6H IV ; 

Start 3/6/19 at 16:00;  Stop 3/6/19 at 17:31;  Status DC


Ampicillin Sodium/ Sulbactam Sodium 3 gm/Dextrose 100 ml @  200 mls/hr Q6H IV  

Last administered on 3/8/19at 12:22;  Start 3/6/19 at 19:00;  Stop 3/8/19 at 

14:25;  Status DC


Bisacodyl (Dulcolax Suppository) 10 mg Q4HP  PRN [place] CONSTIPATION;  Start 3/4/19 

at 14:00


Cefazolin Sodium 1 gm/Dextrose 50 ml @  100 mls/hr Q8H IV  Last administered on 

3/6/19at 06:13;  Start 3/4/19 at 15:00;  Stop 3/6/19 at 07:29;  Status DC


Docusate Sodium (Colace) 100 mg BID PO  Last administered on 3/12/19at 21:10;  

Start 3/4/19 at 09:00


Fentanyl Citrate (Sublimaze) 25 mcg Q5MP  PRN IV MODERATE PAIN (PS 4-7);  Start 

3/6/19 at 15:15;  Stop 3/6/19 at 16:15;  Status DC


Heparin Sodium (Porcine) (Heparin) 5,000 units Q12H SC  Last administered on 

3/13/19at 09:20;  Start 3/4/19 at 21:00


Home Med (Med Rec Complete!)  ASDIRECTED XX ;  Start 3/4/19 at 16:00;  Stop 

3/4/19 at 16:00;  Status DC


Hydromorphone HCl (Dilaudid) 0.2 mg Q5MP  PRN IV MODERATE/SEVERE PAIN (PS 5-10);

 Start 3/6/19 at 15:15;  Stop 3/6/19 at 16:15;  Status DC


Ketorolac Tromethamine (ToRADol) 30 mg Q6H IV  Last administered on 3/9/19at 

08:00;  Start 3/4/19 at 14:00;  Stop 3/9/19 at 13:59;  Status DC


Lactated Ringer's 1,000 ml @  100 mls/hr Q10H IV ;  Start 3/6/19 at 15:15;  Stop

3/6/19 at 16:15;  Status DC


Magnesium Hydroxide (Milk Of Magnesia) 30 ml DAILY PO ;  Start 3/4/19 at 09:00


Metoclopramide HCl (REGLAN INJection) 10 mg Q6HP  PRN IV NAUSEA OR VOMITING;  

Start 3/6/19 at 15:15;  Stop 3/6/19 at 16:15;  Status DC


Miscellaneous (Unresolved Clarification Entry) SEE LABEL COMMENTS DAILY XX ;  

Start 3/10/19 at 09:00;  Stop 3/10/19 at 14:57;  Status DC


Ondansetron HCl (ZOFRAN INJection) 4 mg Q4HP  PRN IV NAUSEA;  Start 3/4/19 at 

14:00


Ondansetron HCl (ZOFRAN INJection) 4 mg Q4HP  PRN IV NAUSEA OR VOMITING;  Start 

3/6/19 at 15:15;  Stop 3/6/19 at 16:15;  Status DC


Oxycodone/ Acetaminophen (Percocet 5mg/ 325mg Tablet) 1 tab ASDIRECTED  PRN PO 

MILD/MODERATE PAIN (PS 1-7);  Start 3/6/19 at 15:15;  Stop 3/6/19 at 16:15;  

Status DC


Oxycodone/ Acetaminophen (Percocet 5mg/ 325mg Tablet) 1 tab Q4HP  PRN PO 

MODERATE PAIN (PS 5-7);  Start 3/4/19 at 14:00


Oxycodone/ Acetaminophen (Percocet 5mg/ 325mg Tablet) 2 tab Q4HP  PRN PO SEVERE 

PAIN (PS 8-10);  Start 3/4/19 at 14:00


Potassium Chloride/Dextrose/ Sod Cl 1,000 ml @  75 mls/hr [id number] IV  Last 

administered on 3/4/19at 17:04;  Start 3/4/19 at 14:00;  Stop 3/5/19 at 01:28;  

Status DC


Sodium Chloride (Saline Lock Flush) 2 ml ASDIRECTED  PRN IV SEE LABEL COMMENTS; 

Start 3/11/19 at 12:15


Sodium Chloride (Saline Lock Flush) 2 ml SLF IV  Last administered on 3/13/19at 

14:37;  Start 3/11/19 at 14:00


Vancomycin HCl 1000 mg/IV Miscellaneous Supplies 1 each/ Dextrose 270 ml @  270 

mls/hr Q12H IV ;  Start 3/13/19 at 20:00


Vancomycin HCl 1000 mg/IV Miscellaneous Supplies 1 each/ Dextrose 270 ml @  270 

mls/hr Q12H IV  Last administered on 3/13/19at 02:35;  Start 3/5/19 at 14:00;  

Stop 3/13/19 at 13:23;  Status DC





Allergies


Coded Allergies:  


     No Known Allergies (Unverified , 2/16/18)





Objective


Physical Examination


Examination


GENERAL APPEARANCE:Patient seen, laying in bed, awake, alert, and oriented. 

Comfortable, in no acute distress.


SKIN: Warm and moist. Incision open, clean granulating tissue. Serous drainage 

moderate. No odor. No cellulites. 


LUNGS: Clear to auscultation bilaterally. No wheezing appreciated.


HEART: No chest wall abnormalities. Regular rate and rhythm with no murmurs 

appreciated.


Vital Signs





Vital Signs








  Date Time  Temp Pulse Resp B/P (MAP) Pulse Ox O2 Delivery O2 Flow Rate FiO2


 


3/13/19 12:00 96.7 68 18 132/76 (94) 97   








I&Os











I&O- Last 24 Hours up to 6 AM 


 


 3/13/19





 05:59


 


Intake Total 1710 ml


 


Output Total 2800 ml


 


Balance -1090 ml











Laboratory Data


Labs 24H


Laboratory Tests 2


3/13/19 05:36: 


Anion Gap 7L, Glomerular Filtration Rate > 60.0, Blood Urea Nitrogen 13, 

Creatinine 0.96, Sodium Level 139, Potassium Level 4.1, Chloride Level 108H, 

Carbon Dioxide Level 24, Calcium Level 8.8, C-Reactive Protein, Quantitative < 

0.30


CBC/BMP


Laboratory Tests


3/13/19 05:36








Calcium Level 8.8


Microbiology





Microbiology


3/6/19 Wound Culture - Final, Complete


         


3/6/19 Anaerobic Culture - Final, Complete


         


3/6/19 Wound Culture - Final, Resulted


         


3/6/19 Anaerobic Culture - Final, Resulted


         


3/5/19 Gram Stain - Final, Complete


         


3/5/19 Wound Culture - Final, Complete


         Staphylococcus Sp Coag Neg





Impression


Open wound inferior chest.


Continue with local wound care, antibiotics. 


Wound is getting smaller.


Plan for surgical secondary closure next week with possible flap.





Plan / VTE


VTE Prophylaxis Ordered?:  Yes











LITA PATTON DO              Mar 13, 2019 16:09

## 2019-03-14 VITALS — DIASTOLIC BLOOD PRESSURE: 65 MMHG | SYSTOLIC BLOOD PRESSURE: 109 MMHG

## 2019-03-14 VITALS — DIASTOLIC BLOOD PRESSURE: 66 MMHG | SYSTOLIC BLOOD PRESSURE: 100 MMHG

## 2019-03-14 VITALS — SYSTOLIC BLOOD PRESSURE: 111 MMHG | DIASTOLIC BLOOD PRESSURE: 78 MMHG

## 2019-03-14 VITALS — DIASTOLIC BLOOD PRESSURE: 72 MMHG | SYSTOLIC BLOOD PRESSURE: 110 MMHG

## 2019-03-14 VITALS — DIASTOLIC BLOOD PRESSURE: 70 MMHG | SYSTOLIC BLOOD PRESSURE: 112 MMHG

## 2019-03-14 VITALS — SYSTOLIC BLOOD PRESSURE: 110 MMHG | DIASTOLIC BLOOD PRESSURE: 76 MMHG

## 2019-03-14 LAB
HCT VFR BLD AUTO: 42.3 % (ref 42–52)
HGB BLD-MCNC: 14.5 G/DL (ref 13.5–17.5)
MCH RBC QN AUTO: 33 PG (ref 27–33)
MCHC RBC AUTO-ENTMCNC: 34.3 G/DL (ref 32–36.5)
MCV RBC AUTO: 96.1 FL (ref 80–96)
PLATELET # BLD AUTO: 285 10^3/UL (ref 150–450)
RBC # BLD AUTO: 4.4 10^6/UL (ref 4.3–6.1)
WBC # BLD AUTO: 9.8 10^3/UL (ref 4–10)

## 2019-03-14 RX ADMIN — DEXTROSE MONOHYDRATE SCH MLS/HR: 50 INJECTION, SOLUTION INTRAVENOUS at 20:00

## 2019-03-14 RX ADMIN — SODIUM CHLORIDE SCH UNITS: 4.5 INJECTION, SOLUTION INTRAVENOUS at 21:07

## 2019-03-14 RX ADMIN — SODIUM CHLORIDE, PRESERVATIVE FREE SCH ML: 5 INJECTION INTRAVENOUS at 05:22

## 2019-03-14 RX ADMIN — MAGNESIUM HYDROXIDE SCH ML: 400 SUSPENSION ORAL at 08:47

## 2019-03-14 RX ADMIN — SODIUM CHLORIDE, PRESERVATIVE FREE SCH ML: 5 INJECTION INTRAVENOUS at 14:57

## 2019-03-14 RX ADMIN — SODIUM CHLORIDE, PRESERVATIVE FREE SCH ML: 5 INJECTION INTRAVENOUS at 22:00

## 2019-03-14 RX ADMIN — DOCUSATE SODIUM SCH MG: 100 CAPSULE, LIQUID FILLED ORAL at 08:47

## 2019-03-14 RX ADMIN — SODIUM CHLORIDE SCH UNITS: 4.5 INJECTION, SOLUTION INTRAVENOUS at 08:47

## 2019-03-14 RX ADMIN — DEXTROSE MONOHYDRATE SCH MLS/HR: 50 INJECTION, SOLUTION INTRAVENOUS at 08:46

## 2019-03-14 RX ADMIN — DOCUSATE SODIUM SCH MG: 100 CAPSULE, LIQUID FILLED ORAL at 21:00

## 2019-03-14 NOTE — REP
PA and lateral chest:

Comparisons are 03/12/2019 and 03/13/2019.

The right pleural effusion has resolved.

Lung fields are clear but appear hyperinflated, unchanged.

Cardiac size is normal.  The bekah, mediastinum, skeletal structures are

unremarkable.

Impression:

Resolved right pleural effusion.

Hyperinflation.  Half

 

 

Electronically Signed by

Cristian Gaston MD 03/14/2019 08:19 A

## 2019-03-15 VITALS — SYSTOLIC BLOOD PRESSURE: 129 MMHG | DIASTOLIC BLOOD PRESSURE: 79 MMHG

## 2019-03-15 VITALS — SYSTOLIC BLOOD PRESSURE: 90 MMHG | DIASTOLIC BLOOD PRESSURE: 52 MMHG

## 2019-03-15 VITALS — DIASTOLIC BLOOD PRESSURE: 71 MMHG | SYSTOLIC BLOOD PRESSURE: 107 MMHG

## 2019-03-15 VITALS — DIASTOLIC BLOOD PRESSURE: 60 MMHG | SYSTOLIC BLOOD PRESSURE: 99 MMHG

## 2019-03-15 VITALS — DIASTOLIC BLOOD PRESSURE: 76 MMHG | SYSTOLIC BLOOD PRESSURE: 97 MMHG

## 2019-03-15 VITALS — SYSTOLIC BLOOD PRESSURE: 113 MMHG | DIASTOLIC BLOOD PRESSURE: 73 MMHG

## 2019-03-15 LAB
BUN SERPL-MCNC: 13 MG/DL (ref 7–18)
CALCIUM SERPL-MCNC: 8.8 MG/DL (ref 8.5–10.1)
CHLORIDE SERPL-SCNC: 108 MEQ/L (ref 98–107)
CO2 SERPL-SCNC: 28 MEQ/L (ref 21–32)
CREAT SERPL-MCNC: 0.94 MG/DL (ref 0.7–1.3)
GFR SERPL CREATININE-BSD FRML MDRD: > 60 ML/MIN/{1.73_M2} (ref 60–?)
GLUCOSE SERPL-MCNC: 91 MG/DL (ref 70–100)
POTASSIUM SERPL-SCNC: 4 MEQ/L (ref 3.5–5.1)
SODIUM SERPL-SCNC: 139 MEQ/L (ref 136–145)

## 2019-03-15 RX ADMIN — DEXTROSE MONOHYDRATE SCH MLS/HR: 50 INJECTION, SOLUTION INTRAVENOUS at 19:33

## 2019-03-15 RX ADMIN — SODIUM CHLORIDE SCH UNITS: 4.5 INJECTION, SOLUTION INTRAVENOUS at 07:56

## 2019-03-15 RX ADMIN — SODIUM CHLORIDE SCH UNITS: 4.5 INJECTION, SOLUTION INTRAVENOUS at 19:33

## 2019-03-15 RX ADMIN — MAGNESIUM HYDROXIDE SCH ML: 400 SUSPENSION ORAL at 07:56

## 2019-03-15 RX ADMIN — SODIUM CHLORIDE, PRESERVATIVE FREE SCH ML: 5 INJECTION INTRAVENOUS at 06:00

## 2019-03-15 RX ADMIN — DOCUSATE SODIUM SCH MG: 100 CAPSULE, LIQUID FILLED ORAL at 19:33

## 2019-03-15 RX ADMIN — SODIUM CHLORIDE, PRESERVATIVE FREE SCH ML: 5 INJECTION INTRAVENOUS at 22:00

## 2019-03-15 RX ADMIN — DOCUSATE SODIUM SCH MG: 100 CAPSULE, LIQUID FILLED ORAL at 07:56

## 2019-03-15 RX ADMIN — SODIUM CHLORIDE, PRESERVATIVE FREE SCH ML: 5 INJECTION INTRAVENOUS at 11:29

## 2019-03-15 RX ADMIN — DEXTROSE MONOHYDRATE SCH MLS/HR: 50 INJECTION, SOLUTION INTRAVENOUS at 07:55

## 2019-03-15 NOTE — IPN
DATE:  03/14/2019

 

Ankur is still in the hospital waiting for surgery for wound closure. He has 
no

complaints.  No fever or chills.  No nausea, vomiting or diarrhea.  No cough or

shortness of breath.  Dr. Galvan has been doing his dressing changes with

Hydrofera Blue.

 

Temperature is 97.6, pulse 86, respirations 18, blood pressure 110/72, O2 sat 
97%

on room air.  Heart:  Normal S1, S2 with no murmurs, rubs or gallops.  Lungs are

clear.  Abdomen:  Soft, nontender. Open wound at the Xyphoid process.

 

LABORATORY DATA:  White count 9.8, hemoglobin 14.5, hematocrit 42.3, platelets

380, potassium 4.1, chloride 108, bicarb 24, BUN 13, creatinine 0.96, glucose 
92,

calcium 8.8, CRP less than 0.3.

 

IMPRESSION:  Wound dehiscence with culture positive for staphylococcus coag

negative on 03/05 and proprionobacterium acne on 03/06.  Both pathogens are

susceptible to vancomycin.  Vancomycin trough was 13 on 03/12/2019, which is

appropriate.

 

PLAN:  DC IV vancomycuin after Date of surgery when he will have closure of the 
chest wound.

MTDD
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
## 2019-03-15 NOTE — IPN
DATE:  03/14/2019

 

I appreciate infection disease as well as plastic surgery input into care of

patient. It appears that the wound has been healing quite nicely by secondary

intention with granulation tissue present and the plan for secondary closure of

this wound is planned for next week. At this point the patient still is

susceptible to vancomycin for the antibiotics and continues on this. Otherwise

patient has been afebrile and white count has been normal. He did have a little

bit of leg swelling but since starting his TEDs and sequentials he seems to be

doing much better with leg swelling.

 

IMPRESSION AND PLAN:

This time patient has plans for continued hospitalization for antibiotics for

treatment of this chest wound and plans for secondary closure next week. Continue

with current treatment at this time.

## 2019-03-15 NOTE — IPN
DATE:  03/15/2019

 

The patient is doing well.  He states that his swelling in his legs has

completely resolved with leg elevation and support stockings.  He has been

afebrile.  He has not had any other complaints.  Plan is for dressing change

later on today with Dr. Galvan, but otherwise, he has been doing well.  He seems

to be increasing his activity.  He does not have any shortness of breath.  No

other complaints at this time.  Dressings have been clean and dry.

 

IMPRESSION AND PLAN:  The patient has an open wound of the chest, will need

secondary closure and plans on this next week.  Will continue with current

antibiotics, dressing changes with probable dressing change today, and then again

on Monday per Dr. Galvan.  We will transfer the attending primary attending back

to Dr. Macdonald when he returns to the area.

## 2019-03-16 VITALS — SYSTOLIC BLOOD PRESSURE: 127 MMHG | DIASTOLIC BLOOD PRESSURE: 89 MMHG

## 2019-03-16 VITALS — SYSTOLIC BLOOD PRESSURE: 122 MMHG | DIASTOLIC BLOOD PRESSURE: 59 MMHG

## 2019-03-16 VITALS — DIASTOLIC BLOOD PRESSURE: 85 MMHG | SYSTOLIC BLOOD PRESSURE: 114 MMHG

## 2019-03-16 VITALS — SYSTOLIC BLOOD PRESSURE: 119 MMHG | DIASTOLIC BLOOD PRESSURE: 75 MMHG

## 2019-03-16 VITALS — DIASTOLIC BLOOD PRESSURE: 81 MMHG | SYSTOLIC BLOOD PRESSURE: 131 MMHG

## 2019-03-16 VITALS — SYSTOLIC BLOOD PRESSURE: 98 MMHG | DIASTOLIC BLOOD PRESSURE: 56 MMHG

## 2019-03-16 LAB
BUN SERPL-MCNC: 12 MG/DL (ref 7–18)
CALCIUM SERPL-MCNC: 9.2 MG/DL (ref 8.5–10.1)
CHLORIDE SERPL-SCNC: 105 MEQ/L (ref 98–107)
CO2 SERPL-SCNC: 25 MEQ/L (ref 21–32)
CREAT SERPL-MCNC: 1.04 MG/DL (ref 0.7–1.3)
GFR SERPL CREATININE-BSD FRML MDRD: > 60 ML/MIN/{1.73_M2} (ref 60–?)
GLUCOSE SERPL-MCNC: 87 MG/DL (ref 70–100)
POTASSIUM SERPL-SCNC: 4.6 MEQ/L (ref 3.5–5.1)
SODIUM SERPL-SCNC: 136 MEQ/L (ref 136–145)

## 2019-03-16 RX ADMIN — DOCUSATE SODIUM SCH MG: 100 CAPSULE, LIQUID FILLED ORAL at 09:00

## 2019-03-16 RX ADMIN — MAGNESIUM HYDROXIDE SCH ML: 400 SUSPENSION ORAL at 09:00

## 2019-03-16 RX ADMIN — DOCUSATE SODIUM SCH MG: 100 CAPSULE, LIQUID FILLED ORAL at 20:02

## 2019-03-16 RX ADMIN — SODIUM CHLORIDE, PRESERVATIVE FREE SCH ML: 5 INJECTION INTRAVENOUS at 05:15

## 2019-03-16 RX ADMIN — DEXTROSE MONOHYDRATE SCH MLS/HR: 50 INJECTION, SOLUTION INTRAVENOUS at 19:47

## 2019-03-16 RX ADMIN — SODIUM CHLORIDE, PRESERVATIVE FREE SCH ML: 5 INJECTION INTRAVENOUS at 21:10

## 2019-03-16 RX ADMIN — SODIUM CHLORIDE SCH UNITS: 4.5 INJECTION, SOLUTION INTRAVENOUS at 20:02

## 2019-03-16 RX ADMIN — DEXTROSE MONOHYDRATE SCH MLS/HR: 50 INJECTION, SOLUTION INTRAVENOUS at 09:04

## 2019-03-16 RX ADMIN — SODIUM CHLORIDE, PRESERVATIVE FREE SCH ML: 5 INJECTION INTRAVENOUS at 14:00

## 2019-03-16 RX ADMIN — SODIUM CHLORIDE SCH UNITS: 4.5 INJECTION, SOLUTION INTRAVENOUS at 09:00

## 2019-03-17 VITALS — SYSTOLIC BLOOD PRESSURE: 104 MMHG | DIASTOLIC BLOOD PRESSURE: 78 MMHG

## 2019-03-17 VITALS — DIASTOLIC BLOOD PRESSURE: 68 MMHG | SYSTOLIC BLOOD PRESSURE: 98 MMHG

## 2019-03-17 VITALS — DIASTOLIC BLOOD PRESSURE: 70 MMHG | SYSTOLIC BLOOD PRESSURE: 120 MMHG

## 2019-03-17 VITALS — SYSTOLIC BLOOD PRESSURE: 98 MMHG | DIASTOLIC BLOOD PRESSURE: 60 MMHG

## 2019-03-17 VITALS — DIASTOLIC BLOOD PRESSURE: 68 MMHG | SYSTOLIC BLOOD PRESSURE: 102 MMHG

## 2019-03-17 VITALS — SYSTOLIC BLOOD PRESSURE: 122 MMHG | DIASTOLIC BLOOD PRESSURE: 90 MMHG

## 2019-03-17 VITALS — SYSTOLIC BLOOD PRESSURE: 92 MMHG | DIASTOLIC BLOOD PRESSURE: 58 MMHG

## 2019-03-17 VITALS — DIASTOLIC BLOOD PRESSURE: 66 MMHG | SYSTOLIC BLOOD PRESSURE: 104 MMHG

## 2019-03-17 RX ADMIN — SODIUM CHLORIDE, PRESERVATIVE FREE SCH ML: 5 INJECTION INTRAVENOUS at 14:00

## 2019-03-17 RX ADMIN — SODIUM CHLORIDE SCH UNITS: 4.5 INJECTION, SOLUTION INTRAVENOUS at 21:00

## 2019-03-17 RX ADMIN — DOCUSATE SODIUM SCH MG: 100 CAPSULE, LIQUID FILLED ORAL at 09:00

## 2019-03-17 RX ADMIN — SODIUM CHLORIDE, PRESERVATIVE FREE SCH ML: 5 INJECTION INTRAVENOUS at 05:03

## 2019-03-17 RX ADMIN — DEXTROSE MONOHYDRATE SCH MLS/HR: 50 INJECTION, SOLUTION INTRAVENOUS at 20:00

## 2019-03-17 RX ADMIN — DEXTROSE MONOHYDRATE SCH MLS/HR: 50 INJECTION, SOLUTION INTRAVENOUS at 09:23

## 2019-03-17 RX ADMIN — DOCUSATE SODIUM SCH MG: 100 CAPSULE, LIQUID FILLED ORAL at 21:00

## 2019-03-17 RX ADMIN — SODIUM CHLORIDE, PRESERVATIVE FREE SCH ML: 5 INJECTION INTRAVENOUS at 21:28

## 2019-03-17 RX ADMIN — SODIUM CHLORIDE SCH UNITS: 4.5 INJECTION, SOLUTION INTRAVENOUS at 09:00

## 2019-03-17 RX ADMIN — MAGNESIUM HYDROXIDE SCH ML: 400 SUSPENSION ORAL at 09:00
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
## 2019-03-18 VITALS — DIASTOLIC BLOOD PRESSURE: 85 MMHG | SYSTOLIC BLOOD PRESSURE: 120 MMHG

## 2019-03-18 VITALS — SYSTOLIC BLOOD PRESSURE: 116 MMHG | DIASTOLIC BLOOD PRESSURE: 72 MMHG

## 2019-03-18 VITALS — DIASTOLIC BLOOD PRESSURE: 75 MMHG | SYSTOLIC BLOOD PRESSURE: 118 MMHG

## 2019-03-18 VITALS — SYSTOLIC BLOOD PRESSURE: 111 MMHG | DIASTOLIC BLOOD PRESSURE: 72 MMHG

## 2019-03-18 VITALS — SYSTOLIC BLOOD PRESSURE: 96 MMHG | DIASTOLIC BLOOD PRESSURE: 62 MMHG

## 2019-03-18 RX ADMIN — SODIUM CHLORIDE SCH UNITS: 4.5 INJECTION, SOLUTION INTRAVENOUS at 20:39

## 2019-03-18 RX ADMIN — DOCUSATE SODIUM SCH MG: 100 CAPSULE, LIQUID FILLED ORAL at 07:37

## 2019-03-18 RX ADMIN — SODIUM CHLORIDE SCH UNITS: 4.5 INJECTION, SOLUTION INTRAVENOUS at 07:37

## 2019-03-18 RX ADMIN — MAGNESIUM HYDROXIDE SCH ML: 400 SUSPENSION ORAL at 07:37

## 2019-03-18 RX ADMIN — DEXTROSE MONOHYDRATE SCH MLS/HR: 50 INJECTION, SOLUTION INTRAVENOUS at 08:28

## 2019-03-18 RX ADMIN — DEXTROSE MONOHYDRATE SCH MLS/HR: 50 INJECTION, SOLUTION INTRAVENOUS at 19:32

## 2019-03-18 RX ADMIN — DOCUSATE SODIUM SCH MG: 100 CAPSULE, LIQUID FILLED ORAL at 20:39

## 2019-03-18 RX ADMIN — SODIUM CHLORIDE, PRESERVATIVE FREE SCH ML: 5 INJECTION INTRAVENOUS at 05:11

## 2019-03-18 RX ADMIN — SODIUM CHLORIDE, PRESERVATIVE FREE SCH ML: 5 INJECTION INTRAVENOUS at 14:00

## 2019-03-18 RX ADMIN — SODIUM CHLORIDE, PRESERVATIVE FREE SCH ML: 5 INJECTION INTRAVENOUS at 19:32

## 2019-03-19 VITALS — SYSTOLIC BLOOD PRESSURE: 134 MMHG | DIASTOLIC BLOOD PRESSURE: 83 MMHG

## 2019-03-19 VITALS — DIASTOLIC BLOOD PRESSURE: 70 MMHG | SYSTOLIC BLOOD PRESSURE: 110 MMHG

## 2019-03-19 VITALS — SYSTOLIC BLOOD PRESSURE: 122 MMHG | DIASTOLIC BLOOD PRESSURE: 73 MMHG

## 2019-03-19 VITALS — SYSTOLIC BLOOD PRESSURE: 114 MMHG | DIASTOLIC BLOOD PRESSURE: 75 MMHG

## 2019-03-19 VITALS — DIASTOLIC BLOOD PRESSURE: 78 MMHG | SYSTOLIC BLOOD PRESSURE: 129 MMHG

## 2019-03-19 VITALS — DIASTOLIC BLOOD PRESSURE: 67 MMHG | SYSTOLIC BLOOD PRESSURE: 100 MMHG

## 2019-03-19 RX ADMIN — SODIUM CHLORIDE, PRESERVATIVE FREE SCH ML: 5 INJECTION INTRAVENOUS at 14:00

## 2019-03-19 RX ADMIN — DOCUSATE SODIUM SCH MG: 100 CAPSULE, LIQUID FILLED ORAL at 07:55

## 2019-03-19 RX ADMIN — MAGNESIUM HYDROXIDE SCH ML: 400 SUSPENSION ORAL at 07:55

## 2019-03-19 RX ADMIN — SODIUM CHLORIDE SCH UNITS: 4.5 INJECTION, SOLUTION INTRAVENOUS at 20:24

## 2019-03-19 RX ADMIN — DEXTROSE MONOHYDRATE SCH MLS/HR: 50 INJECTION, SOLUTION INTRAVENOUS at 07:56

## 2019-03-19 RX ADMIN — SODIUM CHLORIDE, PRESERVATIVE FREE SCH ML: 5 INJECTION INTRAVENOUS at 20:25

## 2019-03-19 RX ADMIN — SODIUM CHLORIDE, PRESERVATIVE FREE SCH ML: 5 INJECTION INTRAVENOUS at 05:03

## 2019-03-19 RX ADMIN — DEXTROSE MONOHYDRATE SCH MLS/HR: 50 INJECTION, SOLUTION INTRAVENOUS at 20:00

## 2019-03-19 RX ADMIN — SODIUM CHLORIDE SCH UNITS: 4.5 INJECTION, SOLUTION INTRAVENOUS at 07:55

## 2019-03-19 RX ADMIN — DOCUSATE SODIUM SCH MG: 100 CAPSULE, LIQUID FILLED ORAL at 20:24

## 2019-03-19 NOTE — IPNPDOC
Subjective


General


Date/Time Seen


The patient was seen on 3/19/19.





Subject


Chief Complaint/History


The patient is a 48-year-old male admitted with a reason for visit of Wound 

Dehiscence. Doing well.


No pain, fever, chills.





Current Medications


Current Medications





Current Medications


Acetaminophen (Tylenol Tab) 650 mg Q6HP  PRN PO T > 101.5 or HA;  Start 3/4/19 

at 14:00


Acetaminophen/ Hydrocodone Bitart (Norco, Anexsia 5/325) 1 tab Q3HP  PRN PO MILD

PAIN (PS 1-4);  Start 3/4/19 at 14:00;  Stop 3/17/19 at 14:13;  Status DC


Ampicillin Sodium/ Sulbactam Sodium 3 gm/Dextrose 100 ml @  200 mls/hr Q6H IV ; 

Start 3/6/19 at 16:00;  Stop 3/6/19 at 17:31;  Status DC


Ampicillin Sodium/ Sulbactam Sodium 3 gm/Dextrose 100 ml @  200 mls/hr Q6H IV  

Last administered on 3/8/19at 12:22;  Start 3/6/19 at 19:00;  Stop 3/8/19 at 

14:25;  Status DC


Bisacodyl (Dulcolax Suppository) 10 mg Q4HP  PRN [place] CONSTIPATION;  Start 3/4/19 

at 14:00


Cefazolin Sodium 1 gm/Dextrose 50 ml @  100 mls/hr Q8H IV  Last administered on 

3/6/19at 06:13;  Start 3/4/19 at 15:00;  Stop 3/6/19 at 07:29;  Status DC


Docusate Sodium (Colace) 100 mg BID PO  Last administered on 3/12/19at 21:10;  

Start 3/4/19 at 09:00


Fentanyl Citrate (Sublimaze) 25 mcg Q5MP  PRN IV MODERATE PAIN (PS 4-7);  Start 

3/6/19 at 15:15;  Stop 3/6/19 at 16:15;  Status DC


Heparin Sodium (Porcine) (Heparin) 5,000 units Q12H SC  Last administered on 

3/14/19at 21:07;  Start 3/4/19 at 21:00


Home Med (Med Rec Complete!)  ASDIRECTED XX ;  Start 3/4/19 at 16:00;  Stop 

3/4/19 at 16:00;  Status DC


Hydromorphone HCl (Dilaudid) 0.2 mg Q5MP  PRN IV MODERATE/SEVERE PAIN (PS 5-10);

 Start 3/6/19 at 15:15;  Stop 3/6/19 at 16:15;  Status DC


Ketorolac Tromethamine (ToRADol) 30 mg Q6H IV  Last administered on 3/9/19at 

08:00;  Start 3/4/19 at 14:00;  Stop 3/9/19 at 13:59;  Status DC


Lactated Ringer's 1,000 ml @  100 mls/hr Q10H IV ;  Start 3/6/19 at 15:15;  Stop

3/6/19 at 16:15;  Status DC


Magnesium Hydroxide (Milk Of Magnesia) 30 ml DAILY PO ;  Start 3/4/19 at 09:00


Metoclopramide HCl (REGLAN INJection) 10 mg Q6HP  PRN IV NAUSEA OR VOMITING;  

Start 3/6/19 at 15:15;  Stop 3/6/19 at 16:15;  Status DC


Miscellaneous (Unresolved Clarification Entry) SEE LABEL COMMENTS DAILY XX ;  

Start 3/10/19 at 09:00;  Stop 3/10/19 at 14:57;  Status DC


Miscellaneous (Unresolved Clarification Entry) SEE LABEL COMMENTS DAILY XX ;  

Start 3/16/19 at 09:00


Ondansetron HCl (ZOFRAN INJection) 4 mg Q4HP  PRN IV NAUSEA;  Start 3/4/19 at 

14:00


Ondansetron HCl (ZOFRAN INJection) 4 mg Q4HP  PRN IV NAUSEA OR VOMITING;  Start 

3/6/19 at 15:15;  Stop 3/6/19 at 16:15;  Status DC


Oxycodone/ Acetaminophen (Percocet 5mg/ 325mg Tablet) 1 tab ASDIRECTED  PRN PO 

MILD/MODERATE PAIN (PS 1-7);  Start 3/6/19 at 15:15;  Stop 3/6/19 at 16:15;  

Status DC


Oxycodone/ Acetaminophen (Percocet 5mg/ 325mg Tablet) 1 tab Q4HP  PRN PO 

MODERATE PAIN (PS 5-7);  Start 3/4/19 at 14:00;  Stop 3/17/19 at 14:12;  Status 

DC


Oxycodone/ Acetaminophen (Percocet 5mg/ 325mg Tablet) 2 tab Q4HP  PRN PO SEVERE 

PAIN (PS 8-10);  Start 3/4/19 at 14:00;  Stop 3/17/19 at 14:12;  Status DC


Potassium Chloride/Dextrose/ Sod Cl 1,000 ml @  75 mls/hr [id number] IV  Last 

administered on 3/4/19at 17:04;  Start 3/4/19 at 14:00;  Stop 3/5/19 at 01:28;  

Status DC


Sodium Chloride (Saline Lock Flush) 2 ml ASDIRECTED  PRN IV SEE LABEL COMMENTS; 

Start 3/11/19 at 12:15


Sodium Chloride (Saline Lock Flush) 2 ml SLF IV  Last administered on 3/19/19at 

05:03;  Start 3/11/19 at 14:00


Vancomycin HCl 1000 mg/IV Miscellaneous Supplies 1 each/ Dextrose 270 ml @  270 

mls/hr Q12H IV  Last administered on 3/19/19at 07:56;  Start 3/13/19 at 20:00


Vancomycin HCl 1000 mg/IV Miscellaneous Supplies 1 each/ Dextrose 270 ml @  270 

mls/hr Q12H IV  Last administered on 3/13/19at 02:35;  Start 3/5/19 at 14:00;  

Stop 3/13/19 at 13:23;  Status DC





Allergies


Coded Allergies:  


     No Known Allergies (Unverified , 2/16/18)





Objective


Physical Examination


Examination


GENERAL APPEARANCE:Patient seen, laying in bed, awake, alert, and oriented. 

Comfortable, in no acute distress.


SKIN: Warm and moist. Wound 1.5x1.3cm. Clean granulating tissue. Undermining 

much improved. Granulating tissue to pericardium. Serous clear drainage minor. 

No odor. 


LUNGS: Clear to auscultation bilaterally. No wheezing appreciated.


HEART: No chest wall abnormalities. Regular rate and rhythm with no murmurs 

appreciated.


ABDOMEN: Abdomen is soft NT/ND, healed scars from chest tubes.


Vital Signs





Vital Signs








  Date Time  Temp Pulse Resp B/P (MAP) Pulse Ox O2 Delivery O2 Flow Rate FiO2


 


3/19/19 12:00 98.9 88 18 134/83 (100) 97   








I&Os











I&O- Last 24 Hours up to 6 AM 


 


 3/19/19





 06:00


 


Intake Total 1950 ml


 


Output Total 2875 ml


 


Balance -925 ml











Impression


Lower sternal wound.


Plan for OR tomorrow for I/D, possible closure lower chest wound.


Different approaches of closure including local cutaneous and rectus muscle 

flaps discussed with patient. Risks, benefits and alternatives of the procedure 

discussed with patient at length, including option for no surgery. He states 

understanding and wishes to proceed.


NPO after MN


OR 3/20/19





Plan / VTE


VTE Prophylaxis Ordered?:  Yes











LITA PATTON DO              Mar 19, 2019 14:58
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
## 2019-03-20 VITALS — DIASTOLIC BLOOD PRESSURE: 82 MMHG | SYSTOLIC BLOOD PRESSURE: 128 MMHG

## 2019-03-20 VITALS — SYSTOLIC BLOOD PRESSURE: 111 MMHG | DIASTOLIC BLOOD PRESSURE: 76 MMHG

## 2019-03-20 VITALS — SYSTOLIC BLOOD PRESSURE: 104 MMHG | DIASTOLIC BLOOD PRESSURE: 62 MMHG

## 2019-03-20 VITALS — SYSTOLIC BLOOD PRESSURE: 123 MMHG | DIASTOLIC BLOOD PRESSURE: 81 MMHG

## 2019-03-20 VITALS — SYSTOLIC BLOOD PRESSURE: 129 MMHG | DIASTOLIC BLOOD PRESSURE: 82 MMHG

## 2019-03-20 VITALS — SYSTOLIC BLOOD PRESSURE: 128 MMHG | DIASTOLIC BLOOD PRESSURE: 86 MMHG

## 2019-03-20 VITALS — SYSTOLIC BLOOD PRESSURE: 116 MMHG | DIASTOLIC BLOOD PRESSURE: 82 MMHG

## 2019-03-20 VITALS — DIASTOLIC BLOOD PRESSURE: 73 MMHG | SYSTOLIC BLOOD PRESSURE: 113 MMHG

## 2019-03-20 VITALS — SYSTOLIC BLOOD PRESSURE: 117 MMHG | DIASTOLIC BLOOD PRESSURE: 87 MMHG

## 2019-03-20 VITALS — SYSTOLIC BLOOD PRESSURE: 126 MMHG | DIASTOLIC BLOOD PRESSURE: 81 MMHG

## 2019-03-20 VITALS — SYSTOLIC BLOOD PRESSURE: 128 MMHG | DIASTOLIC BLOOD PRESSURE: 84 MMHG

## 2019-03-20 VITALS — DIASTOLIC BLOOD PRESSURE: 74 MMHG | SYSTOLIC BLOOD PRESSURE: 119 MMHG

## 2019-03-20 PROCEDURE — 0HX5XZZ TRANSFER CHEST SKIN, EXTERNAL APPROACH: ICD-10-PCS | Performed by: PLASTIC SURGERY

## 2019-03-20 RX ADMIN — SODIUM CHLORIDE SCH UNITS: 4.5 INJECTION, SOLUTION INTRAVENOUS at 08:00

## 2019-03-20 RX ADMIN — SODIUM CHLORIDE SCH UNITS: 4.5 INJECTION, SOLUTION INTRAVENOUS at 20:04

## 2019-03-20 RX ADMIN — DEXTROSE MONOHYDRATE SCH MLS/HR: 50 INJECTION, SOLUTION INTRAVENOUS at 07:32

## 2019-03-20 RX ADMIN — SODIUM CHLORIDE, PRESERVATIVE FREE SCH ML: 5 INJECTION INTRAVENOUS at 05:41

## 2019-03-20 RX ADMIN — DOCUSATE SODIUM SCH MG: 100 CAPSULE, LIQUID FILLED ORAL at 20:04

## 2019-03-20 RX ADMIN — SODIUM CHLORIDE, PRESERVATIVE FREE SCH ML: 5 INJECTION INTRAVENOUS at 13:22

## 2019-03-20 RX ADMIN — KETOROLAC TROMETHAMINE SCH MG: 30 INJECTION, SOLUTION INTRAMUSCULAR at 17:30

## 2019-03-20 RX ADMIN — DOCUSATE SODIUM SCH MG: 100 CAPSULE, LIQUID FILLED ORAL at 08:00

## 2019-03-20 RX ADMIN — MAGNESIUM HYDROXIDE SCH ML: 400 SUSPENSION ORAL at 08:00

## 2019-03-20 RX ADMIN — DEXTROSE MONOHYDRATE SCH MLS/HR: 50 INJECTION, SOLUTION INTRAVENOUS at 20:04

## 2019-03-20 RX ADMIN — SODIUM CHLORIDE, PRESERVATIVE FREE SCH ML: 5 INJECTION INTRAVENOUS at 21:02

## 2019-03-20 NOTE — POST-OPPD
Postoperative Procedure Note


Date Of Procedure:  Mar 20, 2019


PREOPERATIVE DIAGNOSIS: Open wound lower sternum





POSTOPERATIVE DIAGNOSIS: same





FINDINGS: open wound [id number] 





PROCEDURE: Irrigation and debridement and closure of sternal wound with 

rotational cutaneous flap. 





SURGEON: Dr Patton





ASSISTANT: Dr Macdonald





ANESTHESIA: general





SPECIMENS: debrided tissue





ESTIMATED BLOOD LOSS: 20cc





REPLACED: none





DRAINS: 10mm JENNIFER drain





COMPLICATIONS: none





POSTOPERATIVE CONDITION: stable











LITA PATTON DO              Mar 20, 2019 12:34

## 2019-03-21 VITALS — SYSTOLIC BLOOD PRESSURE: 139 MMHG | DIASTOLIC BLOOD PRESSURE: 90 MMHG

## 2019-03-21 VITALS — DIASTOLIC BLOOD PRESSURE: 89 MMHG | SYSTOLIC BLOOD PRESSURE: 132 MMHG

## 2019-03-21 VITALS — DIASTOLIC BLOOD PRESSURE: 65 MMHG | SYSTOLIC BLOOD PRESSURE: 115 MMHG

## 2019-03-21 VITALS — SYSTOLIC BLOOD PRESSURE: 134 MMHG | DIASTOLIC BLOOD PRESSURE: 88 MMHG

## 2019-03-21 VITALS — DIASTOLIC BLOOD PRESSURE: 57 MMHG | SYSTOLIC BLOOD PRESSURE: 103 MMHG

## 2019-03-21 LAB
BASOPHILS # BLD AUTO: 0.1 10^3/UL (ref 0–0.2)
BASOPHILS NFR BLD AUTO: 0.5 % (ref 0–1)
BUN SERPL-MCNC: 12 MG/DL (ref 7–18)
CALCIUM SERPL-MCNC: 9.4 MG/DL (ref 8.5–10.1)
CHLORIDE SERPL-SCNC: 102 MEQ/L (ref 98–107)
CO2 SERPL-SCNC: 24 MEQ/L (ref 21–32)
CREAT SERPL-MCNC: 0.93 MG/DL (ref 0.7–1.3)
EOSINOPHIL # BLD AUTO: 0.4 10^3/UL (ref 0–0.5)
EOSINOPHIL NFR BLD AUTO: 2.3 % (ref 0–3)
GFR SERPL CREATININE-BSD FRML MDRD: > 60 ML/MIN/{1.73_M2} (ref 60–?)
GLUCOSE SERPL-MCNC: 95 MG/DL (ref 70–100)
HCT VFR BLD AUTO: 38.4 % (ref 42–52)
HGB BLD-MCNC: 13.2 G/DL (ref 13.5–17.5)
LYMPHOCYTES # BLD AUTO: 2.9 10^3/UL (ref 1.5–4.5)
LYMPHOCYTES NFR BLD AUTO: 17.6 % (ref 24–44)
MCH RBC QN AUTO: 32.4 PG (ref 27–33)
MCHC RBC AUTO-ENTMCNC: 34.4 G/DL (ref 32–36.5)
MCV RBC AUTO: 94.1 FL (ref 80–96)
MONOCYTES # BLD AUTO: 1 10^3/UL (ref 0–0.8)
MONOCYTES NFR BLD AUTO: 6.4 % (ref 0–5)
NEUTROPHILS # BLD AUTO: 11.8 10^3/UL (ref 1.8–7.7)
NEUTROPHILS NFR BLD AUTO: 72.5 % (ref 36–66)
PLATELET # BLD AUTO: 249 10^3/UL (ref 150–450)
POTASSIUM SERPL-SCNC: 3.9 MEQ/L (ref 3.5–5.1)
RBC # BLD AUTO: 4.08 10^6/UL (ref 4.3–6.1)
SODIUM SERPL-SCNC: 135 MEQ/L (ref 136–145)
WBC # BLD AUTO: 16.2 10^3/UL (ref 4–10)

## 2019-03-21 RX ADMIN — SODIUM CHLORIDE SCH UNITS: 4.5 INJECTION, SOLUTION INTRAVENOUS at 08:14

## 2019-03-21 RX ADMIN — SODIUM CHLORIDE SCH UNITS: 4.5 INJECTION, SOLUTION INTRAVENOUS at 20:30

## 2019-03-21 RX ADMIN — MAGNESIUM HYDROXIDE SCH ML: 400 SUSPENSION ORAL at 08:15

## 2019-03-21 RX ADMIN — DOCUSATE SODIUM SCH MG: 100 CAPSULE, LIQUID FILLED ORAL at 20:30

## 2019-03-21 RX ADMIN — KETOROLAC TROMETHAMINE SCH MG: 30 INJECTION, SOLUTION INTRAMUSCULAR at 12:47

## 2019-03-21 RX ADMIN — SODIUM CHLORIDE, PRESERVATIVE FREE SCH ML: 5 INJECTION INTRAVENOUS at 20:31

## 2019-03-21 RX ADMIN — SODIUM CHLORIDE, PRESERVATIVE FREE SCH ML: 5 INJECTION INTRAVENOUS at 06:00

## 2019-03-21 RX ADMIN — DEXTROSE MONOHYDRATE SCH MLS/HR: 50 INJECTION, SOLUTION INTRAVENOUS at 08:15

## 2019-03-21 RX ADMIN — SODIUM CHLORIDE, PRESERVATIVE FREE SCH ML: 5 INJECTION INTRAVENOUS at 12:47

## 2019-03-21 RX ADMIN — KETOROLAC TROMETHAMINE SCH MG: 30 INJECTION, SOLUTION INTRAMUSCULAR at 06:31

## 2019-03-21 RX ADMIN — DOCUSATE SODIUM SCH MG: 100 CAPSULE, LIQUID FILLED ORAL at 08:15

## 2019-03-21 RX ADMIN — KETOROLAC TROMETHAMINE SCH MG: 30 INJECTION, SOLUTION INTRAMUSCULAR at 18:01

## 2019-03-21 RX ADMIN — KETOROLAC TROMETHAMINE SCH MG: 30 INJECTION, SOLUTION INTRAMUSCULAR at 00:03
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
## 2019-03-21 NOTE — IPNPDOC
Subjective


General


Date/Time Seen


The patient was seen on 3/21/19 at 0800.





Subject


Chief Complaint/History


The patient is a 48-year-old male admitted with a reason for visit of Wound 

Dehiscence. S/p wound irrigation, debridement and closure POD 1. Doing well. 

Pain controlled with Toradol. Ambulating.





Current Medications


Current Medications





Current Medications


Acetaminophen (Tylenol Tab) 650 mg Q6HP  PRN PO T > 101.5 or HA;  Start 3/4/19 

at 14:00


Acetaminophen/ Hydrocodone Bitart (Norco, Anexsia 5/325) 1 tab Q3HP  PRN PO MILD

PAIN (PS 1-4);  Start 3/20/19 at 12:30


Acetaminophen/ Hydrocodone Bitart (Norco, Anexsia 5/325) 1 tab Q3HP  PRN PO MILD

PAIN (PS 1-4);  Start 3/4/19 at 14:00;  Stop 3/17/19 at 14:13;  Status DC


Ampicillin Sodium/ Sulbactam Sodium 3 gm/Dextrose 100 ml @  200 mls/hr Q6H IV ; 

Start 3/6/19 at 16:00;  Stop 3/6/19 at 17:31;  Status DC


Ampicillin Sodium/ Sulbactam Sodium 3 gm/Dextrose 100 ml @  200 mls/hr Q6H IV  

Last administered on 3/8/19at 12:22;  Start 3/6/19 at 19:00;  Stop 3/8/19 at 

14:25;  Status DC


Bisacodyl (Dulcolax Suppository) 10 mg Q4HP  PRN [place] CONSTIPATION;  Start 3/4/19 

at 14:00


Cefazolin Sodium 1 gm/Dextrose 50 ml @  100 mls/hr Q8H IV  Last administered on 

3/6/19at 06:13;  Start 3/4/19 at 15:00;  Stop 3/6/19 at 07:29;  Status DC


Docusate Sodium (Colace) 100 mg BID PO  Last administered on 3/21/19at 08:15;  

Start 3/4/19 at 09:00


Fentanyl Citrate (Sublimaze) 25 mcg Q5MP  PRN IV MODERATE PAIN (PS 4-7);  Start 

3/20/19 at 12:45;  Stop 3/20/19 at 13:45;  Status DC


Fentanyl Citrate (Sublimaze) 25 mcg Q5MP  PRN IV MODERATE PAIN (PS 4-7);  Start 

3/6/19 at 15:15;  Stop 3/6/19 at 16:15;  Status DC


Heparin Sodium (Porcine) (Heparin) 5,000 units Q12H SC  Last administered on 

3/21/19at 08:14;  Start 3/4/19 at 21:00


Home Med (Med Rec Complete!)  ASDIRECTED XX ;  Start 3/4/19 at 16:00;  Stop 

3/4/19 at 16:00;  Status DC


Hydromorphone HCl (Dilaudid) 0.2 mg Q5MP  PRN IV MODERATE/SEVERE PAIN (PS 5-10);

 Start 3/6/19 at 15:15;  Stop 3/6/19 at 16:15;  Status DC


Ketorolac Tromethamine (ToRADol) 30 mg Q6H IV  Last administered on 3/21/19at 

06:31;  Start 3/20/19 at 18:00;  Stop 3/25/19 at 17:59


Ketorolac Tromethamine (ToRADol) 30 mg Q6H IV  Last administered on 3/9/19at 

08:00;  Start 3/4/19 at 14:00;  Stop 3/9/19 at 13:59;  Status DC


Lactated Ringer's 1,000 ml @  100 mls/hr Q10H IV ;  Start 3/20/19 at 12:45;  

Stop 3/20/19 at 13:45;  Status DC


Lactated Ringer's 1,000 ml @  100 mls/hr Q10H IV ;  Start 3/6/19 at 15:15;  Stop

3/6/19 at 16:15;  Status DC


Magnesium Hydroxide (Milk Of Magnesia) 30 ml DAILY PO ;  Start 3/4/19 at 09:00


Metoclopramide HCl (REGLAN INJection) 10 mg Q6HP  PRN IV NAUSEA OR VOMITING;  

Start 3/6/19 at 15:15;  Stop 3/6/19 at 16:15;  Status DC


Miscellaneous (Unresolved Clarification Entry) SEE LABEL COMMENTS DAILY XX ;  

Start 3/10/19 at 09:00;  Stop 3/10/19 at 14:57;  Status DC


Miscellaneous (Unresolved Clarification Entry) SEE LABEL COMMENTS DAILY XX ;  

Start 3/16/19 at 09:00;  Stop 3/20/19 at 12:34;  Status DC


Ondansetron HCl (ZOFRAN INJection) 4 mg Q4HP  PRN IV NAUSEA;  Start 3/4/19 at 

14:00


Ondansetron HCl (ZOFRAN INJection) 4 mg Q4HP  PRN IV NAUSEA OR VOMITING;  Start 

3/6/19 at 15:15;  Stop 3/6/19 at 16:15;  Status DC


Oxycodone/ Acetaminophen (Percocet 5mg/ 325mg Tablet) 1 tab ASDIRECTED  PRN PO 

MILD/MODERATE PAIN (PS 1-7);  Start 3/20/19 at 12:45;  Stop 3/20/19 at 13:45;  S

tatus DC


Oxycodone/ Acetaminophen (Percocet 5mg/ 325mg Tablet) 1 tab ASDIRECTED  PRN PO 

MILD/MODERATE PAIN (PS 1-7);  Start 3/6/19 at 15:15;  Stop 3/6/19 at 16:15;  

Status DC


Oxycodone/ Acetaminophen (Percocet 5mg/ 325mg Tablet) 1 tab Q4HP  PRN PO 

MODERATE PAIN (PS 5-7) Last administered on 3/20/19at 12:34;  Start 3/20/19 at 

12:30


Oxycodone/ Acetaminophen (Percocet 5mg/ 325mg Tablet) 1 tab Q4HP  PRN PO 

MODERATE PAIN (PS 5-7);  Start 3/4/19 at 14:00;  Stop 3/17/19 at 14:12;  Status 

DC


Oxycodone/ Acetaminophen (Percocet 5mg/ 325mg Tablet) 2 tab Q4HP  PRN PO SEVERE 

PAIN (PS 8-10);  Start 3/20/19 at 12:30


Oxycodone/ Acetaminophen (Percocet 5mg/ 325mg Tablet) 2 tab Q4HP  PRN PO SEVERE 

PAIN (PS 8-10);  Start 3/4/19 at 14:00;  Stop 3/17/19 at 14:12;  Status DC


Potassium Chloride/Dextrose/ Sod Cl 1,000 ml @  75 mls/hr [id number] IV  Last 

administered on 3/20/19at 13:21;  Start 3/20/19 at 13:00;  Stop 3/20/19 at 

18:49;  Status DC


Potassium Chloride/Dextrose/ Sod Cl 1,000 ml @  75 mls/hr [id number] IV  Last 

administered on 3/4/19at 17:04;  Start 3/4/19 at 14:00;  Stop 3/5/19 at 01:28;  

Status DC


Sodium Chloride (Saline Lock Flush) 2 ml ASDIRECTED  PRN IV SEE LABEL COMMENTS; 

Start 3/11/19 at 12:15


Sodium Chloride (Saline Lock Flush) 2 ml SLF IV  Last administered on 3/21/19at 

06:00;  Start 3/11/19 at 14:00


Vancomycin HCl 1000 mg/IV Miscellaneous Supplies 1 each/ Dextrose 270 ml @  270 

mls/hr Q12H IV  Last administered on 3/21/19at 08:15;  Start 3/13/19 at 20:00


Vancomycin HCl 1000 mg/IV Miscellaneous Supplies 1 each/ Dextrose 270 ml @  270 

mls/hr Q12H IV  Last administered on 3/13/19at 02:35;  Start 3/5/19 at 14:00;  

Stop 3/13/19 at 13:23;  Status DC





Allergies


Coded Allergies:  


     No Known Allergies (Unverified , 2/16/18)





Objective


Physical Examination


Examination


GENERAL APPEARANCE:Patient seen, laying in bed, awake, alert, and oriented. 

Comfortable, in no acute distress.


SKIN: Warm and moist. Incision intact. Nylon sutures. JENNIFER drain with serous 

sanguinous drainage 10cc. 


HEENT: Normocephalic,  atraumatic. Pink palpebral conjunctiva, anicteric 

sclerae. Lips and mucosa appear moist.


NECK: Supple, no thyromegaly. No obvious jugular venous distention.


LUNGS: Clear to auscultation bilaterally. No wheezing appreciated.


HEART: No chest wall abnormalities. Regular rate and rhythm with no murmurs ap

preciated.


Vital Signs





Vital Signs








  Date Time  Temp Pulse Resp B/P (MAP) Pulse Ox O2 Delivery O2 Flow Rate FiO2


 


3/21/19 08:00 96.8 72 18 139/90 (106) 96   


 


3/20/19 12:42       3 








I&Os











I&O- Last 24 Hours up to 6 AM 


 


 3/21/19





 05:59


 


Intake Total 3490 ml


 


Output Total 4550 ml


 


Balance -1060 ml











Laboratory Data


Labs 24H


Laboratory Tests 2


3/21/19 05:05: 


Immature Granulocyte % (Auto) 0.7, White Blood Count 16.2H, Red Blood Count 4.08

L, Hemoglobin 13.2L, Hematocrit 38.4L, Mean Corpuscular Volume 94.1, Mean 

Corpuscular Hemoglobin 32.4, Mean Corpuscular Hemoglobin Concent 34.4, Red Cell 

Distribution Width 12.8, Platelet Count 249, Neutrophils (%) (Auto) 72.5H, 

Lymphocytes (%) (Auto) 17.6L, Monocytes (%) (Auto) 6.4H, Eosinophils (%) (Auto) 

2.3, Basophils (%) (Auto) 0.5, Neutrophils # (Auto) 11.8H, Lymphocytes # (Auto) 

2.9, Monocytes # (Auto) 1.0H, Eosinophils # (Auto) 0.4, Basophils # (Auto) 0.1, 

Nucleated Red Blood Cells % (auto) 0.0, Anion Gap 9, Glomerular Filtration Rate 

> 60.0, Blood Urea Nitrogen 12, Creatinine 0.93, Sodium Level 135L, Potassium 

Level 3.9, Chloride Level 102, Carbon Dioxide Level 24, Calcium Level 9.4


CBC/BMP


Laboratory Tests


3/21/19 05:05








Red Blood Count 4.08 L, Mean Corpuscular Volume 94.1, Mean Corpuscular 

Hemoglobin 32.4, Mean Corpuscular Hemoglobin Concent 34.4, Red Cell Distribution

Width 12.8, Neutrophils (%) (Auto) 72.5 H, Lymphocytes (%) (Auto) 17.6 L, 

Monocytes (%) (Auto) 6.4 H, Eosinophils (%) (Auto) 2.3, Basophils (%) (Auto) 

0.5, Neutrophils # (Auto) 11.8 H, Lymphocytes # (Auto) 2.9, Monocytes # (Auto) 

1.0 H, Eosinophils # (Auto) 0.4, Basophils # (Auto) 0.1, Calcium Level 9.4





Impression


Lower chest wound.


Incision intact.


Continue with JENNIFER monitoring. 


Minimize upper extremity motion.


Pain control


No pressure on the chest.


D/c planning.





Plan / VTE


VTE Prophylaxis Ordered?:  Yes











LITA PATTON DO              Mar 21, 2019 10:12
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
## 2019-03-22 VITALS — DIASTOLIC BLOOD PRESSURE: 75 MMHG | SYSTOLIC BLOOD PRESSURE: 117 MMHG

## 2019-03-22 VITALS — DIASTOLIC BLOOD PRESSURE: 72 MMHG | SYSTOLIC BLOOD PRESSURE: 115 MMHG

## 2019-03-22 VITALS — DIASTOLIC BLOOD PRESSURE: 64 MMHG | SYSTOLIC BLOOD PRESSURE: 102 MMHG

## 2019-03-22 LAB
BASOPHILS # BLD AUTO: 0.1 10^3/UL (ref 0–0.2)
BASOPHILS NFR BLD AUTO: 0.9 % (ref 0–1)
BUN SERPL-MCNC: 18 MG/DL (ref 7–18)
CALCIUM SERPL-MCNC: 8.5 MG/DL (ref 8.5–10.1)
CHLORIDE SERPL-SCNC: 108 MEQ/L (ref 98–107)
CO2 SERPL-SCNC: 27 MEQ/L (ref 21–32)
CREAT SERPL-MCNC: 1.18 MG/DL (ref 0.7–1.3)
EOSINOPHIL # BLD AUTO: 0.8 10^3/UL (ref 0–0.5)
EOSINOPHIL NFR BLD AUTO: 6.7 % (ref 0–3)
GFR SERPL CREATININE-BSD FRML MDRD: > 60 ML/MIN/{1.73_M2} (ref 60–?)
GLUCOSE SERPL-MCNC: 80 MG/DL (ref 70–100)
HCT VFR BLD AUTO: 38.6 % (ref 42–52)
HGB BLD-MCNC: 12.9 G/DL (ref 13.5–17.5)
LYMPHOCYTES # BLD AUTO: 3.1 10^3/UL (ref 1.5–4.5)
LYMPHOCYTES NFR BLD AUTO: 27.1 % (ref 24–44)
MCH RBC QN AUTO: 32.3 PG (ref 27–33)
MCHC RBC AUTO-ENTMCNC: 33.4 G/DL (ref 32–36.5)
MCV RBC AUTO: 96.5 FL (ref 80–96)
MONOCYTES # BLD AUTO: 1 10^3/UL (ref 0–0.8)
MONOCYTES NFR BLD AUTO: 8.7 % (ref 0–5)
NEUTROPHILS # BLD AUTO: 6.4 10^3/UL (ref 1.8–7.7)
NEUTROPHILS NFR BLD AUTO: 56.1 % (ref 36–66)
PLATELET # BLD AUTO: 234 10^3/UL (ref 150–450)
POTASSIUM SERPL-SCNC: 4.4 MEQ/L (ref 3.5–5.1)
RBC # BLD AUTO: 4 10^6/UL (ref 4.3–6.1)
SODIUM SERPL-SCNC: 142 MEQ/L (ref 136–145)
WBC # BLD AUTO: 11.4 10^3/UL (ref 4–10)

## 2019-03-22 RX ADMIN — SODIUM CHLORIDE SCH UNITS: 4.5 INJECTION, SOLUTION INTRAVENOUS at 09:00

## 2019-03-22 RX ADMIN — SODIUM CHLORIDE, PRESERVATIVE FREE SCH ML: 5 INJECTION INTRAVENOUS at 06:39

## 2019-03-22 RX ADMIN — DOCUSATE SODIUM SCH MG: 100 CAPSULE, LIQUID FILLED ORAL at 09:00

## 2019-03-22 RX ADMIN — MAGNESIUM HYDROXIDE SCH ML: 400 SUSPENSION ORAL at 09:00

## 2019-03-22 RX ADMIN — KETOROLAC TROMETHAMINE SCH MG: 30 INJECTION, SOLUTION INTRAMUSCULAR at 00:56

## 2019-03-22 RX ADMIN — KETOROLAC TROMETHAMINE SCH MG: 30 INJECTION, SOLUTION INTRAMUSCULAR at 06:40

## 2019-03-22 NOTE — IPNPDOC
Subjective


General


Date/Time Seen


The patient was seen on 3/22/19 at 07:50.





Subject


Chief Complaint/History


The patient is a 48-year-old male admitted with a reason for visit of Wound 

Dehiscence. Post wound closure POD 2. Doing well. Pain improved. Ambulating.





Current Medications


Current Medications





Current Medications


Acetaminophen (Tylenol Tab) 650 mg Q6HP  PRN PO T > 101.5 or HA;  Start 3/4/19 

at 14:00


Acetaminophen/ Hydrocodone Bitart (Norco, Anexsia 5/325) 1 tab Q3HP  PRN PO MILD

PAIN (PS 1-4);  Start 3/20/19 at 12:30


Acetaminophen/ Hydrocodone Bitart (Norco, Anexsia 5/325) 1 tab Q3HP  PRN PO MILD

PAIN (PS 1-4);  Start 3/4/19 at 14:00;  Stop 3/17/19 at 14:13;  Status DC


Ampicillin Sodium/ Sulbactam Sodium 3 gm/Dextrose 100 ml @  200 mls/hr Q6H IV ; 

Start 3/6/19 at 16:00;  Stop 3/6/19 at 17:31;  Status DC


Ampicillin Sodium/ Sulbactam Sodium 3 gm/Dextrose 100 ml @  200 mls/hr Q6H IV  

Last administered on 3/8/19at 12:22;  Start 3/6/19 at 19:00;  Stop 3/8/19 at 

14:25;  Status DC


Bisacodyl (Dulcolax Suppository) 10 mg Q4HP  PRN LA CONSTIPATION;  Start 3/4/19 

at 14:00


Cefazolin Sodium 1 gm/Dextrose 50 ml @  100 mls/hr Q8H IV  Last administered on 

3/6/19at 06:13;  Start 3/4/19 at 15:00;  Stop 3/6/19 at 07:29;  Status DC


Docusate Sodium (Colace) 100 mg BID PO  Last administered on 3/21/19at 20:30;  

Start 3/4/19 at 09:00


Fentanyl Citrate (Sublimaze) 25 mcg Q5MP  PRN IV MODERATE PAIN (PS 4-7);  Start 

3/20/19 at 12:45;  Stop 3/20/19 at 13:45;  Status DC


Fentanyl Citrate (Sublimaze) 25 mcg Q5MP  PRN IV MODERATE PAIN (PS 4-7);  Start 

3/6/19 at 15:15;  Stop 3/6/19 at 16:15;  Status DC


Heparin Sodium (Porcine) (Heparin) 5,000 units Q12H SC  Last administered on 

3/21/19at 20:30;  Start 3/4/19 at 21:00


Home Med (Med Rec Complete!)  ASDIRECTED XX ;  Start 3/4/19 at 16:00;  Stop 

3/4/19 at 16:00;  Status DC


Hydromorphone HCl (Dilaudid) 0.2 mg Q5MP  PRN IV MODERATE/SEVERE PAIN (PS 5-10);

 Start 3/6/19 at 15:15;  Stop 3/6/19 at 16:15;  Status DC


Ketorolac Tromethamine (ToRADol) 30 mg Q6H IV  Last administered on 3/22/19at 

06:40;  Start 3/20/19 at 18:00;  Stop 3/25/19 at 17:59


Ketorolac Tromethamine (ToRADol) 30 mg Q6H IV  Last administered on 3/9/19at 

08:00;  Start 3/4/19 at 14:00;  Stop 3/9/19 at 13:59;  Status DC


Lactated Ringer's 1,000 ml @  100 mls/hr Q10H IV ;  Start 3/20/19 at 12:45;  

Stop 3/20/19 at 13:45;  Status DC


Lactated Ringer's 1,000 ml @  100 mls/hr Q10H IV ;  Start 3/6/19 at 15:15;  Stop

3/6/19 at 16:15;  Status DC


Magnesium Hydroxide (Milk Of Magnesia) 30 ml DAILY PO ;  Start 3/4/19 at 09:00


Metoclopramide HCl (REGLAN INJection) 10 mg Q6HP  PRN IV NAUSEA OR VOMITING;  

Start 3/6/19 at 15:15;  Stop 3/6/19 at 16:15;  Status DC


Miscellaneous (Unresolved Clarification Entry) SEE LABEL COMMENTS DAILY XX ;  

Start 3/10/19 at 09:00;  Stop 3/10/19 at 14:57;  Status DC


Miscellaneous (Unresolved Clarification Entry) SEE LABEL COMMENTS DAILY XX ;  

Start 3/16/19 at 09:00;  Stop 3/20/19 at 12:34;  Status DC


Ondansetron HCl (ZOFRAN INJection) 4 mg Q4HP  PRN IV NAUSEA;  Start 3/4/19 at 

14:00


Ondansetron HCl (ZOFRAN INJection) 4 mg Q4HP  PRN IV NAUSEA OR VOMITING;  Start 

3/6/19 at 15:15;  Stop 3/6/19 at 16:15;  Status DC


Oxycodone/ Acetaminophen (Percocet 5mg/ 325mg Tablet) 1 tab ASDIRECTED  PRN PO 

MILD/MODERATE PAIN (PS 1-7);  Start 3/20/19 at 12:45;  Stop 3/20/19 at 13:45;  

Status DC


Oxycodone/ Acetaminophen (Percocet 5mg/ 325mg Tablet) 1 tab ASDIRECTED  PRN PO 

MILD/MODERATE PAIN (PS 1-7);  Start 3/6/19 at 15:15;  Stop 3/6/19 at 16:15;  

Status DC


Oxycodone/ Acetaminophen (Percocet 5mg/ 325mg Tablet) 1 tab Q4HP  PRN PO 

MODERATE PAIN (PS 5-7) Last administered on 3/20/19at 12:34;  Start 3/20/19 at 

12:30


Oxycodone/ Acetaminophen (Percocet 5mg/ 325mg Tablet) 1 tab Q4HP  PRN PO 

MODERATE PAIN (PS 5-7);  Start 3/4/19 at 14:00;  Stop 3/17/19 at 14:12;  Status 

DC


Oxycodone/ Acetaminophen (Percocet 5mg/ 325mg Tablet) 2 tab Q4HP  PRN PO SEVERE 

PAIN (PS 8-10);  Start 3/20/19 at 12:30


Oxycodone/ Acetaminophen (Percocet 5mg/ 325mg Tablet) 2 tab Q4HP  PRN PO SEVERE 

PAIN (PS 8-10);  Start 3/4/19 at 14:00;  Stop 3/17/19 at 14:12;  Status DC


Potassium Chloride/Dextrose/ Sod Cl 1,000 ml @  75 mls/hr [id number] IV  Last ad

ministered on 3/20/19at 13:21;  Start 3/20/19 at 13:00;  Stop 3/20/19 at 18:49; 

Status DC


Potassium Chloride/Dextrose/ Sod Cl 1,000 ml @  75 mls/hr [id number] IV  Last 

administered on 3/4/19at 17:04;  Start 3/4/19 at 14:00;  Stop 3/5/19 at 01:28;  

Status DC


Sodium Chloride (Saline Lock Flush) 2 ml ASDIRECTED  PRN IV SEE LABEL COMMENTS; 

Start 3/11/19 at 12:15


Sodium Chloride (Saline Lock Flush) 2 ml SLF IV  Last administered on 3/22/19at 

06:39;  Start 3/11/19 at 14:00


Vancomycin HCl 1000 mg/IV Miscellaneous Supplies 1 each/ Dextrose 270 ml @  270 

mls/hr Q12H IV  Last administered on 3/21/19at 08:15;  Start 3/13/19 at 20:00;  

Stop 3/21/19 at 19:54;  Status DC


Vancomycin HCl 1000 mg/IV Miscellaneous Supplies 1 each/ Dextrose 270 ml @  270 

mls/hr Q12H IV  Last administered on 3/13/19at 02:35;  Start 3/5/19 at 14:00;  

Stop 3/13/19 at 13:23;  Status DC





Allergies


Coded Allergies:  


     No Known Allergies (Unverified , 2/16/18)





Objective


Physical Examination


Examination


GENERAL APPEARANCE:Patient seen, laying in bed, awake, alert, and oriented. 

Comfortable, in no acute distress.


SKIN: Warm and moist. Chest incision intact. Nylon sutures in place. JENNIFER drain 

with light serosanguineous drainage 10cc/24hrs. 


LUNGS: Clear to auscultation bilaterally. No wheezing appreciated.


HEART: No chest wall abnormalities. Regular rate and rhythm with no murmurs 

appreciated.


Vital Signs





Vital Signs








  Date Time  Temp Pulse Resp B/P (MAP) Pulse Ox O2 Delivery O2 Flow Rate FiO2


 


3/22/19 08:00 98.0 71 18 117/75 (89) 96   


 


3/20/19 12:42       3 








I&Os











I&O- Last 24 Hours up to 6 AM 


 


 3/22/19





 05:59


 


Intake Total 2280 ml


 


Output Total 2710 ml


 


Balance -430 ml











Laboratory Data


Labs 24H


Laboratory Tests 2


3/22/19 04:57: 


Immature Granulocyte % (Auto) 0.5, White Blood Count 11.4H, Red Blood Count 

4.00L, Hemoglobin 12.9L, Hematocrit 38.6L, Mean Corpuscular Volume 96.5H, Mean 

Corpuscular Hemoglobin 32.3, Mean Corpuscular Hemoglobin Concent 33.4, Red Cell 

Distribution Width 12.8, Platelet Count 234, Neutrophils (%) (Auto) 56.1, 

Lymphocytes (%) (Auto) 27.1, Monocytes (%) (Auto) 8.7H, Eosinophils (%) (Auto) 

6.7H, Basophils (%) (Auto) 0.9, Neutrophils # (Auto) 6.4, Lymphocytes # (Auto) 

3.1, Monocytes # (Auto) 1.0H, Eosinophils # (Auto) 0.8H, Basophils # (Auto) 0.1,

Nucleated Red Blood Cells % (auto) 0.0, Anion Gap 7L, Glomerular Filtration Rate

> 60.0, Blood Urea Nitrogen 18, Creatinine 1.18, Sodium Level 142#, Potassium 

Level 4.4, Chloride Level 108H, Carbon Dioxide Level 27, Calcium Level 8.5


CBC/BMP


Laboratory Tests


3/22/19 04:57








Red Blood Count 4.00 L, Mean Corpuscular Volume 96.5 H, Mean Corpuscular 

Hemoglobin 32.3, Mean Corpuscular Hemoglobin Concent 33.4, Red Cell Distribution

Width 12.8, Neutrophils (%) (Auto) 56.1, Lymphocytes (%) (Auto) 27.1, Monocytes 

(%) (Auto) 8.7 H, Eosinophils (%) (Auto) 6.7 H, Basophils (%) (Auto) 0.9, 

Neutrophils # (Auto) 6.4, Lymphocytes # (Auto) 3.1, Monocytes # (Auto) 1.0 H, 

Eosinophils # (Auto) 0.8 H, Basophils # (Auto) 0.1, Calcium Level 8.5





Impression


Lower chest open wound.


Wound closed, clean, intact.


Dressing changed


Stable for discharge.


Monitor drain at home daily.


F/up plastic surgery 3/27/19 at 11:30





Plan / VTE


VTE Prophylaxis Ordered?:  Yes











LITA PATTON DO              Mar 22, 2019 08:53
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
## 2019-03-22 NOTE — REP
CHEST, TWO VIEWS:

 

Two views of the chest are performed and compared with prior study of 03/21/2019.

 

 

No new infiltrate is seen bilaterally.  The heart is normal in size and the

mediastinal silhouette is unchanged.  The catheter in the soft tissues anterior

to the heart is unchanged in position.

 

IMPRESSION:

 

Stable exam.

 

 

Electronically Signed by

Cristian Lawson MD 03/22/2019 07:52 P

## 2020-11-28 ENCOUNTER — HOSPITAL ENCOUNTER (OUTPATIENT)
Dept: HOSPITAL 53 - M SLEEP | Age: 50
End: 2020-11-28
Payer: OTHER GOVERNMENT

## 2020-11-28 DIAGNOSIS — G47.33: Primary | ICD-10-CM

## 2020-12-02 NOTE — SLEEP
[facility] NOCTURNAL POLYSOMNOGRAPHY



DATE:  11/28/2020



ORDERED BY:  Dr. LEIDY Russell



Nocturnal polysomnography was performed for evaluation of sleep physiology.



Eight hours and 53 minutes of data were reviewed.  There were 401.5 minutes of

sleep identified.  Sleep latency was mildly prolonged at 24 minutes.  REM

latency was normal at 68 minutes.  Sleep architecture showed fragmentation. 

There were three REM cycles noted.  Overall sleep efficiency was 76.5%.  The

electrocardiogram showed a sinus rhythm with an average heart rate of 64 beats

per minute, rate range 44-82.  EEG showed normal waveforms for wake and sleep. 

There were 54 respiratory events identified of ten seconds in duration or

greater for an apnea hypopnea index of 8.2.  The events were not exclusive to

sleep stage, they were primarily obstructive, not exclusive to body posture. 

Arousals from respiratory events occurred 4.9 times per hour and occasional

oxygen desaturations below 90% were noted.  There was some activity in the limb

leads noted throughout the study.  Limb movement arousal index was 6.7.



IMPRESSIONS:  Obstructive sleep apnea syndrome (G47.33).  Apnea hypopnea index

8.1.



RECOMMENDATION:  The patient should be encouraged to [facility].  In the interim, alcohol and sedative

avoidance should be practiced and caution exercised during the operation of

motor vehicles.

## 2021-07-18 ENCOUNTER — HOSPITAL ENCOUNTER (INPATIENT)
Dept: HOSPITAL 53 - M ED | Age: 51
LOS: 2 days | Discharge: HOME HEALTH | DRG: 189 | End: 2021-07-20
Attending: INTERNAL MEDICINE | Admitting: INTERNAL MEDICINE
Payer: OTHER GOVERNMENT

## 2021-07-18 VITALS — WEIGHT: 121.47 LBS | HEIGHT: 65 IN | BODY MASS INDEX: 20.24 KG/M2

## 2021-07-18 DIAGNOSIS — G47.33: ICD-10-CM

## 2021-07-18 DIAGNOSIS — E83.119: ICD-10-CM

## 2021-07-18 DIAGNOSIS — J96.01: Primary | ICD-10-CM

## 2021-07-18 DIAGNOSIS — Z79.899: ICD-10-CM

## 2021-07-18 DIAGNOSIS — D75.1: ICD-10-CM

## 2021-07-18 DIAGNOSIS — J44.0: ICD-10-CM

## 2021-07-18 DIAGNOSIS — F10.10: ICD-10-CM

## 2021-07-18 DIAGNOSIS — Z79.82: ICD-10-CM

## 2021-07-18 DIAGNOSIS — J44.1: ICD-10-CM

## 2021-07-18 DIAGNOSIS — F17.210: ICD-10-CM

## 2021-07-18 LAB
ALBUMIN SERPL BCG-MCNC: 3.9 GM/DL (ref 3.2–5.2)
ALT SERPL W P-5'-P-CCNC: 23 U/L (ref 12–78)
BASE EXCESS BLDA CALC-SCNC: -0.9 MMOL/L (ref -2–2)
BASOPHILS # BLD AUTO: 0.1 10^3/UL (ref 0–0.2)
BASOPHILS NFR BLD AUTO: 0.5 % (ref 0–1)
BILIRUB CONJ SERPL-MCNC: 0.2 MG/DL (ref 0–0.2)
BILIRUB SERPL-MCNC: 0.6 MG/DL (ref 0.2–1)
BUN SERPL-MCNC: 6 MG/DL (ref 7–18)
CALCIUM SERPL-MCNC: 8.9 MG/DL (ref 8.5–10.1)
CHLORIDE SERPL-SCNC: 109 MEQ/L (ref 98–107)
CO2 BLDA CALC-SCNC: 27.2 MEQ/L (ref 22–29)
CO2 SERPL-SCNC: 27 MEQ/L (ref 21–32)
CREAT SERPL-MCNC: 0.73 MG/DL (ref 0.7–1.3)
EOSINOPHIL # BLD AUTO: 0.7 10^3/UL (ref 0–0.5)
EOSINOPHIL NFR BLD AUTO: 5.1 % (ref 0–3)
GFR SERPL CREATININE-BSD FRML MDRD: > 60 ML/MIN/{1.73_M2} (ref 60–?)
GLUCOSE SERPL-MCNC: 84 MG/DL (ref 70–100)
HCO3 BLDA-SCNC: 25.7 MEQ/L (ref 22–26)
HCO3 STD BLDA-SCNC: 23.5 MEQ/L (ref 22–26)
HCT VFR BLD AUTO: 52.3 % (ref 42–52)
HGB BLD-MCNC: 17.8 G/DL (ref 13.5–17.5)
LYMPHOCYTES # BLD AUTO: 2.2 10^3/UL (ref 1.5–5)
LYMPHOCYTES NFR BLD AUTO: 14.8 % (ref 24–44)
MCH RBC QN AUTO: 33.5 PG (ref 27–33)
MCHC RBC AUTO-ENTMCNC: 34 G/DL (ref 32–36.5)
MCV RBC AUTO: 98.3 FL (ref 80–96)
MONOCYTES # BLD AUTO: 0.8 10^3/UL (ref 0–0.8)
MONOCYTES NFR BLD AUTO: 5.4 % (ref 2–8)
NEUTROPHILS # BLD AUTO: 10.8 10^3/UL (ref 1.5–8.5)
NEUTROPHILS NFR BLD AUTO: 73.7 % (ref 36–66)
PCO2 BLDA: 48.8 MMHG (ref 35–45)
PH BLDA: 7.34 UNITS (ref 7.35–7.45)
PLATELET # BLD AUTO: 219 10^3/UL (ref 150–450)
PO2 BLDA: 56.1 MMHG (ref 75–100)
POTASSIUM SERPL-SCNC: 4.6 MEQ/L (ref 3.5–5.1)
PROT SERPL-MCNC: 6.8 GM/DL (ref 6.4–8.2)
RBC # BLD AUTO: 5.32 10^6/UL (ref 4.3–6.1)
RSV RNA NPH QL NAA+PROBE: NEGATIVE
SAO2 % BLDA: 90.1 % (ref 95–99)
SODIUM SERPL-SCNC: 140 MEQ/L (ref 136–145)
WBC # BLD AUTO: 14.6 10^3/UL (ref 4–10)

## 2021-07-18 NOTE — REPVR
PROCEDURE INFORMATION: 

Exam: XR Chest 

Exam date and time: 7/18/2021 10:08 PM 

Age: 50 years old 

Clinical indication: Cough and dyspnea; Additional info: Dyspnea/cough 



TECHNIQUE: 

Imaging protocol: XR of the chest. 

Views: 1 view. 



COMPARISON: 

CR Chest, 2 view PA, Lat 3/22/2019 7:31 AM 



FINDINGS: 

Lungs: Pulmonary hyperinflation with increased lucency of lung. There are no 

interval infiltrates. 

Pleural spaces: Question of minimal left pleural effusion. 

Heart/Mediastinum: The heart and mediastinum are unchanged. 

Bones/joints: Unremarkable. 



IMPRESSION: 

1. Question of minimal left pleural effusion. 

2. Otherwise stable chest since 03/22/2019 with evidence of COPD. 



Electronically signed by: Asif Plummer On 07/18/2021  22:50:29 PM

## 2021-07-18 NOTE — ECGEPIP
[facility] - ED

                                       

                                       Test Date:    2021

Pat Name:     MYKE BOWMAN             Department:   

Patient ID:   [id number]                 Room:         -

Gender:       Male                     Technician:   SIMON

:          1970               Requested By: MAHENDRA DO

Order Number: [id number]     Reading MD:   Geovanna Wright

                                 Measurements

Intervals                              Axis          

Rate:         91                       P:            61

KY:           158                      QRS:          235

QRSD:         78                       T:            42

QT:           350                                    

QTc:          430                                    

                           Interpretive Statements

Normal sinus rhythm

Right superior axis deviation

NSTTW abnormalities

Pulmonary disease pattern

baseline artifact may affect interpretation

Electronically Signed on 2021 22:18:59 EDT by Geovanna Wright

## 2021-07-19 VITALS — DIASTOLIC BLOOD PRESSURE: 76 MMHG | SYSTOLIC BLOOD PRESSURE: 110 MMHG

## 2021-07-19 VITALS — SYSTOLIC BLOOD PRESSURE: 127 MMHG | DIASTOLIC BLOOD PRESSURE: 84 MMHG

## 2021-07-19 VITALS — DIASTOLIC BLOOD PRESSURE: 78 MMHG | SYSTOLIC BLOOD PRESSURE: 126 MMHG

## 2021-07-19 VITALS — DIASTOLIC BLOOD PRESSURE: 77 MMHG | SYSTOLIC BLOOD PRESSURE: 115 MMHG

## 2021-07-19 VITALS — DIASTOLIC BLOOD PRESSURE: 80 MMHG | SYSTOLIC BLOOD PRESSURE: 126 MMHG

## 2021-07-19 VITALS — DIASTOLIC BLOOD PRESSURE: 83 MMHG | SYSTOLIC BLOOD PRESSURE: 122 MMHG

## 2021-07-19 RX ADMIN — BUDESONIDE SCH MG: 0.5 INHALANT RESPIRATORY (INHALATION) at 19:11

## 2021-07-19 RX ADMIN — IPRATROPIUM BROMIDE AND ALBUTEROL SULFATE SCH ML: .5; 3 SOLUTION RESPIRATORY (INHALATION) at 06:29

## 2021-07-19 RX ADMIN — FOLIC ACID SCH MG: 1 TABLET ORAL at 08:28

## 2021-07-19 RX ADMIN — CEFTRIAXONE SCH MLS/HR: 2 INJECTION, POWDER, FOR SOLUTION INTRAMUSCULAR; INTRAVENOUS at 04:04

## 2021-07-19 RX ADMIN — ASPIRIN SCH MG: 81 TABLET ORAL at 08:27

## 2021-07-19 RX ADMIN — BUDESONIDE SCH MG: 0.5 INHALANT RESPIRATORY (INHALATION) at 06:28

## 2021-07-19 RX ADMIN — IPRATROPIUM BROMIDE AND ALBUTEROL SULFATE SCH ML: .5; 3 SOLUTION RESPIRATORY (INHALATION) at 23:01

## 2021-07-19 RX ADMIN — Medication SCH MG: at 08:27

## 2021-07-19 RX ADMIN — DEXTROSE MONOHYDRATE SCH MLS/HR: 50 INJECTION, SOLUTION INTRAVENOUS at 05:49

## 2021-07-19 RX ADMIN — IPRATROPIUM BROMIDE AND ALBUTEROL SULFATE SCH ML: .5; 3 SOLUTION RESPIRATORY (INHALATION) at 15:31

## 2021-07-19 RX ADMIN — IPRATROPIUM BROMIDE AND ALBUTEROL SULFATE SCH ML: .5; 3 SOLUTION RESPIRATORY (INHALATION) at 11:53

## 2021-07-19 RX ADMIN — MULTIPLE VITAMINS W/ MINERALS TAB SCH TAB: TAB at 08:27

## 2021-07-19 RX ADMIN — IPRATROPIUM BROMIDE AND ALBUTEROL SULFATE SCH ML: .5; 3 SOLUTION RESPIRATORY (INHALATION) at 05:52

## 2021-07-19 RX ADMIN — Medication SCH MG: at 21:43

## 2021-07-19 RX ADMIN — IPRATROPIUM BROMIDE AND ALBUTEROL SULFATE SCH ML: .5; 3 SOLUTION RESPIRATORY (INHALATION) at 19:11

## 2021-07-19 RX ADMIN — ENOXAPARIN SODIUM SCH MG: 40 INJECTION SUBCUTANEOUS at 08:27
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
## 2021-07-20 VITALS — SYSTOLIC BLOOD PRESSURE: 125 MMHG | DIASTOLIC BLOOD PRESSURE: 90 MMHG

## 2021-07-20 VITALS — DIASTOLIC BLOOD PRESSURE: 90 MMHG | SYSTOLIC BLOOD PRESSURE: 125 MMHG

## 2021-07-20 VITALS — DIASTOLIC BLOOD PRESSURE: 78 MMHG | SYSTOLIC BLOOD PRESSURE: 120 MMHG

## 2021-07-20 LAB
ALBUMIN SERPL BCG-MCNC: 3.1 GM/DL (ref 3.2–5.2)
ALT SERPL W P-5'-P-CCNC: 19 U/L (ref 12–78)
BASOPHILS # BLD AUTO: 0.1 10^3/UL (ref 0–0.2)
BASOPHILS NFR BLD AUTO: 0.3 % (ref 0–1)
BILIRUB SERPL-MCNC: 0.3 MG/DL (ref 0.2–1)
BUN SERPL-MCNC: 13 MG/DL (ref 7–18)
CALCIUM SERPL-MCNC: 8.7 MG/DL (ref 8.5–10.1)
CHLORIDE SERPL-SCNC: 109 MEQ/L (ref 98–107)
CO2 SERPL-SCNC: 28 MEQ/L (ref 21–32)
CREAT SERPL-MCNC: 0.76 MG/DL (ref 0.7–1.3)
EOSINOPHIL # BLD AUTO: 0.7 10^3/UL (ref 0–0.5)
EOSINOPHIL NFR BLD AUTO: 3.2 % (ref 0–3)
GFR SERPL CREATININE-BSD FRML MDRD: > 60 ML/MIN/{1.73_M2} (ref 60–?)
GLUCOSE SERPL-MCNC: 89 MG/DL (ref 70–100)
HCT VFR BLD AUTO: 44 % (ref 42–52)
HGB BLD-MCNC: 14.9 G/DL (ref 13.5–17.5)
LYMPHOCYTES # BLD AUTO: 3.1 10^3/UL (ref 1.5–5)
LYMPHOCYTES NFR BLD AUTO: 14.7 % (ref 24–44)
MAGNESIUM SERPL-MCNC: 2 MG/DL (ref 1.8–2.4)
MCH RBC QN AUTO: 33.6 PG (ref 27–33)
MCHC RBC AUTO-ENTMCNC: 33.9 G/DL (ref 32–36.5)
MCV RBC AUTO: 99.3 FL (ref 80–96)
MONOCYTES # BLD AUTO: 1.1 10^3/UL (ref 0–0.8)
MONOCYTES NFR BLD AUTO: 5.5 % (ref 2–8)
NEUTROPHILS # BLD AUTO: 15.7 10^3/UL (ref 1.5–8.5)
NEUTROPHILS NFR BLD AUTO: 75.9 % (ref 36–66)
PLATELET # BLD AUTO: 181 10^3/UL (ref 150–450)
POTASSIUM SERPL-SCNC: 4.2 MEQ/L (ref 3.5–5.1)
PROT SERPL-MCNC: 5.8 GM/DL (ref 6.4–8.2)
RBC # BLD AUTO: 4.43 10^6/UL (ref 4.3–6.1)
SODIUM SERPL-SCNC: 141 MEQ/L (ref 136–145)
WBC # BLD AUTO: 20.6 10^3/UL (ref 4–10)

## 2021-07-20 RX ADMIN — IPRATROPIUM BROMIDE AND ALBUTEROL SULFATE SCH ML: .5; 3 SOLUTION RESPIRATORY (INHALATION) at 11:15

## 2021-07-20 RX ADMIN — ENOXAPARIN SODIUM SCH MG: 40 INJECTION SUBCUTANEOUS at 09:00

## 2021-07-20 RX ADMIN — Medication SCH MG: at 09:00

## 2021-07-20 RX ADMIN — IPRATROPIUM BROMIDE AND ALBUTEROL SULFATE SCH ML: .5; 3 SOLUTION RESPIRATORY (INHALATION) at 07:39

## 2021-07-20 RX ADMIN — BUDESONIDE SCH MG: 0.5 INHALANT RESPIRATORY (INHALATION) at 07:39

## 2021-07-20 RX ADMIN — ASPIRIN SCH MG: 81 TABLET ORAL at 09:00

## 2021-07-20 RX ADMIN — MULTIPLE VITAMINS W/ MINERALS TAB SCH TAB: TAB at 09:00

## 2021-07-20 RX ADMIN — CEFTRIAXONE SCH MLS/HR: 2 INJECTION, POWDER, FOR SOLUTION INTRAMUSCULAR; INTRAVENOUS at 04:01

## 2021-07-20 RX ADMIN — IPRATROPIUM BROMIDE AND ALBUTEROL SULFATE SCH ML: .5; 3 SOLUTION RESPIRATORY (INHALATION) at 04:00

## 2021-07-20 RX ADMIN — FOLIC ACID SCH MG: 1 TABLET ORAL at 09:00

## 2021-07-20 RX ADMIN — DEXTROSE MONOHYDRATE SCH MLS/HR: 50 INJECTION, SOLUTION INTRAVENOUS at 05:20

## 2021-07-20 NOTE — DS.PDOC
Discharge Summary


General


Date of Admission


Jul 19, 2021 at 03:41


Date of Discharge


7/20/2021


Attending Physician:  LINK JONES MD





Discharge Summary


PROCEDURES PERFORMED DURING STAY: None.





ADMITTING DIAGNOSES: 


COPD exacerbation


COPD


SONALI not on CPAP at home


Polycythemia


Hemochromatosis








DISCHARGE DIAGNOSES:


COPD


SONALI not on CPAP at home


Polycythemia


Hemochromatosis





COMPLICATIONS/CHIEF COMPLAINT: Acute Repiratory Failure,Copd W/Acute Bronchitis.





HISTORY OF PRESENT ILLNESS: 50-year-old male patient who presented to the ED 

complaining of worsening shortness of breath worse with exertion gradually 

progressed over several months.  He reports he has a history of COPD but ran out

of the medications about 2 months ago and is only taking albuterol rescue 

inhaler when needed.  Aspect of his symptoms he continued to smoke and drinks 

alcohol about 1-2 beers per day, however prior to admission he had more drinks. 

He was admitted under hospitalist service for further management and evaluation.





HOSPITAL COURSE:


Acute hypoxic/hypercapneic respiratory failure 2/2 to COPD exacerbation


-Patient was started on Solu-Medrol, duo nebs, antibiotics [ceftriaxone and 

azithromycin], Pulmicort.


-Patient continued to smoke prior to the admission and is not on any home 

oxygen.  He was saturating at 90% on 2 L of oxygen during the hospital stay.


-Initially patient had an elevated white count which was trending down after a 

day of antibiotics and treatment.


-Patient had improvement of symptoms after starting him on inhalers and steroids

along with antibiotics.





Elevated lactic acid


-Upon admission patient had elevated lactic acid he was given fluids.


-However there was only minimal improvement with the fluid administration and 

has lactic level


-We suspected the elevated lactic was due to the hypoxia rather than 

hypotension.


-As his hypoxia improved with the medications and inhalers his lactic acidosis 

resolved





SONALI


-Patient had nocturnal polysomnography performed at [facility] 11/28/20- they 

recommended he return to the Sleep center for pressure therapy


-The patient states he was told he does not need a CPAP. 


-Right axis present on EKG





Tobacco use disorder


-patient currently smokes 10-15 cigarettes per day.


-He is not interested in quitting at this time


-Started smoking at age 16 and smoked 2ppd until 3 years ago.


-I think patient should seriously consider quitting smoking given his COPD and 

the need of oxygen during the hospitalization.





Possible alcohol use disorder


-Patient reports that he generally drinks 1-2 beers per day.


-He was put on CIWA protocol.


-He did not need any Ativan during the hospitalization.





DVT prophylaxis: He was on Lovenox to prevent DVT





DISCHARGE MEDICATIONS: Please see below.


 


ALLERGIES: Please see below.





PHYSICAL EXAMINATION ON DISCHARGE:


VITAL SIGNS: Please see below.


GENERAL: a well nourished male sitting in no acute distress


HEENT: sclera nonicteric, poor dentition, mucous membranes moist, trachea 

midline, no lymphadenopathy


CARDIOVASCULAR: Heart sounds distant, regular rate and rhythm


RESPIRATORY: equal air intake bilaterally, distant breath sounds, expiratory 

wheezes appreciated in posterior bases bilaterally, no rhonchi or rales


ABDOMINAL: ventral hernia noted, soft, nontender to palpation in all four 

quadrants, no organomegaly


EXTREMITIES: no lower extremity edema noted on exam. no rashes or ulcerations. 

Tattoo noted on left upper extremity


PSYCHOLOGICAL: alert and oriented x 3, normal affect








LABORATORY DATA: Please see below.





IMAGING:


Chest x-ray 7/18/21: Impression: 1. Question of minimal left pleural effusion. 

2. Otherwise stable chest since 03/22/2019 with evidence of COPD. 





Echocardiogram: 02/15/21: 1.  Small pericardial effusion over the lateral wall 

of the right ventricle and


anteriorly. No diastolic chamber collapse. No significant variation of 

intracardiac velocities. 2. Normal left ventricle internal dimensions, wall 

thickness, regional wall motion, wall thickening, systolic and diastolic 

function.


3. Normal right ventricle size and systolic function. 4. Normal size of the 

atria.








PROGNOSIS: Fair





ACTIVITY: As tolerated.





DIET: Regular diet





DISCHARGE PLAN: Home





DISPOSITION: [facility] Home Health Service.





DISCHARGE INSTRUCTIONS:


1.  Patient was sent home on levofloxacin 750 mg for 3 more days.


2.  Prednisone 20 mg for 5 days, followed by 10 mg for 5 days and stopping after

that.


3.  Anoro Ellipta 1 puff daily for 30 days.


4.  Patient was sent home on 2 L of oxygen with activity.  To maintain 

saturations between 88 to 92% given his COPD.


5.  [facility] for [facility] of these medications.


6.  Continue his rest of home medications.





ITEMS TO FOLLOWUP ON ON OUTPATIENT:


1.  Follow-up with his pulmonologist within 1 week.


2.  Follow-up with PCP within 1 week.





DISCHARGE CONDITION: Stable.





TIME SPENT ON DISCHARGE: 30 minutes.





Vital Signs/I&Os





Vital Signs








  Date Time  Temp Pulse Resp B/P (MAP) Pulse Ox O2 Delivery O2 Flow Rate FiO2


 


7/20/21 10:31     90 Room Air  


 


7/20/21 10:00 98.4 80 16 125/90 (102)    


 


7/20/21 09:00       1.0 














I&O- Last 24 Hours up to 6 AM 


 


 7/20/21





 06:00


 


Intake Total 2705 ml


 


Output Total 900 ml


 


Balance 1805 ml











Laboratory Data


Labs 24H


Laboratory Tests 2


7/20/21 06:56: 


Immature Granulocyte % (Auto) 0.4, Neutrophils (%) (Auto) 75.9H, Lymphocytes (%)

(Auto) 14.7L, Monocytes (%) (Auto) 5.5, Eosinophils (%) (Auto) 3.2H, Basophils 

(%) (Auto) 0.3, Neutrophils # (Auto) 15.7H, Lymphocytes # (Auto) 3.1, Monocytes 

# (Auto) 1.1H, Eosinophils # (Auto) 0.7H, Basophils # (Auto) 0.1, Nucleated Red 

Blood Cells % (auto) 0.0, Anion Gap 4L, Glomerular Filtration Rate > 60.0, 

Calcium Level 8.7, Magnesium Level 2.0, Total Bilirubin 0.3, Aspartate Amino 

Transf (AST/SGOT) 16, Alanine Aminotransferase (ALT/SGPT) 19, Alkaline 

Phosphatase 77, Total Protein 5.8L, Albumin 3.1#L, Albumin/Globulin Ratio 1.1


7/20/21 07:45: Lactic Acid Level 1.8


CBC/BMP


Laboratory Tests


7/20/21 06:56











Discharge Medications


Scheduled


Aspirin (Aspirin EC) 81 Mg Tabec, 81 MG PO DAILY, (Reported)


Cholecalciferol (Vitamin D3) (Vitamin D3) 1,000 Unit Tablet, 1,000 UNITS PO Q2D,

(Reported)


Prednisone (Prednisone) 10 Mg Tablet, 10 MG PO TAPER


   Take 2 tabs daily x 5 days, then 1 tabs daily x 5 days, then  stop 


Umeclidinium Brm/Vilanterol Tr (Anoro Ellipta 62.5-25 Mcg INH) 1 Aer Aer, 1 PUFF

INH DAILY


   1 puff inhaled daily 


levoFLOXacin (levoFLOXacin) 750 Mg Tablet, 750 MG PO DAILY





Allergies


Coded Allergies:  


     No Known Allergies (Unverified , 2/16/18)





GME ATTESTATION


GME ATTESTATION


My faculty preceptor for this patient encounter was physically present during 

the encounter and was fully available. All aspects of the patient interview, 

examination, medical decision making process, and medical care plan development 

were reviewed and approved by the faculty preceptor. The faculty preceptor is 

aware and concurs with the plan as stated in the body of this note and will 

attest to such by his/her cosignature.





ATTENDING NOTE


I, Link Jones MD, have independently examined this patient and performed my 

own physical exam, as well as reviewed the documentation and edited where 

necessary. I have discussed in detail with the resident / student the findings 

and plan of treatment as documented by the resident / student and edited their 

note. I agree with their findings and treatment plan and have edited their d

ocumentation. I will continue to follow the patient during this hospital stay.





Total time spent on this discharge including coordination of care, chart review,

documentation and actual patient care is around 35 minutes











Didier Simpson MD       Jul 20, 2021 18:49


LINK JONES MD             Jul 22, 2021 12:34

## 2021-10-18 ENCOUNTER — HOSPITAL ENCOUNTER (EMERGENCY)
Dept: HOSPITAL 53 - M ED | Age: 51
Discharge: HOME | End: 2021-10-18
Payer: OTHER GOVERNMENT

## 2021-10-18 VITALS — WEIGHT: 127.21 LBS | HEIGHT: 66 IN | BODY MASS INDEX: 20.44 KG/M2

## 2021-10-18 VITALS — DIASTOLIC BLOOD PRESSURE: 89 MMHG | SYSTOLIC BLOOD PRESSURE: 128 MMHG

## 2021-10-18 DIAGNOSIS — Z79.899: ICD-10-CM

## 2021-10-18 DIAGNOSIS — J44.9: Primary | ICD-10-CM

## 2021-10-18 DIAGNOSIS — Z79.82: ICD-10-CM

## 2021-10-18 NOTE — CCD
Continuity of Care Document (CCD)

                             Created on: 10/15/2021



Christiano Ankur DIMAS

External Reference #: [id number]

: 1970

Sex: Male



Demographics





                          Address                   [address] [place] [address] [address]

[place]  [address]

 

                          Home Phone                [phone]

 

                          Preferred Language        Unknown

 

                          Marital Status            Unknown

 

                          Spiritism Affiliation     Unknown

 

                          Race                      White

 

                          Ethnic Group              Not  or 





Author





                          Author                    [facility]

 

                          Organization              Unknown

 

                          Address                   [address]

[place]  [address]



 

                          Phone                     [phone]







Care Team Providers





                    Care Team Member Name Role                Phone

 

                    Martínez Macdonald M.D. AUTM                [phone]

 

                    Mildred Galvan D.O.   AUTM                [phone]

 

                    Castillo Russell M.D. AUTM                [phone]

 

                          AUTM                      Unavailable







Problems





                                        Description

 

                                        No Information Available







Social History





                Type            Date            Description     Comments

 

                Birth Sex                       Unknown          

 

                Smokeless Tobacco                 Former Smokeless Tobacco User,

 Used 1 time Daily  

 

                ETOH Use                        Drinks a couple Beers a day  

 

                Tobacco Use     Start: 89 Patient is a current smoker, smo

kes every day 1 ppd 

x 30 yrs 

 

                Smoking Status  Reviewed: 23 Patient is a current smoker, 

smokes every day 

1 ppd x 30 yrs 

 

                Recent Travel                   He has traveled to the [facility] and [facility]  







Allergies and adverse reactions





                                        Description

 

                                        No Known Drug Allergies







Medications





           Active Medications SIG        Qnty       Indications Ordering Provide

r Date

 

                          Anoro Ellipta                     62.5-25mcg/Inh Aeros

ol                   1 

puff every day  180units                        BRUNA Naik 2018

 

                          Aspir-81                     81mg Tablets DR          

         1 by mouth every 

day                                             Unknown         

 

                          Aleve                     220mg Tablets               

    1 tab by mouth as 

needed                                          Unknown         

 

                                        Albuterol Sulfate HFA                   

  108(90Base) mcg/Act Aerosol           

             2 puffs four times a day as needed 25.5gm                    BRUNA Navarrete 







Immunizations





                                        Description

 

                                        No Information Available







Vital Signs





                Date            Vital           Result          Comment

 

                2021 10:00am BP Systolic     120 mmHg         

 

                    BP Diastolic        84 mmHg              

 

                    Heart Rate          83 /min              

 

                    O2 % BldC Oximetry  96 %                 

 

                    Height              66 inches           5'6"

 

                    Weight              132.00 lb            

 

                    BMI (Body Mass Index) 21.3 kg/m2           

 

                    Ideal Body Weight   142 lb               

 

                    Weight              59.875 kg            

 

                    BSA (Body Surface Area) 1.68 m2              

 

                2019 11:14am BP Systolic     142 mmHg         

 

                    BP Diastolic        66 mmHg              

 

                    Heart Rate          92 /min              

 

                    O2 % BldC Oximetry  99 %                 

 

                    Respiratory Rate    18 /min              

 

                    Body Temperature    98.8 F             

 

                    Height              66 inches           5'6"

 

                    Ideal Body Weight   142 lb               







Results





        Test    Acquired Date Facility Test    Result  H/L     Range   Note

 

                    FVL/Arsen           2021          [employer]

             PDFReport    SEE IMAGE                               

 

             FVC-Pred     4.46 L                                  

 

             FVC-Pre      3.17 L                                  

 

             FVC-%Pred-Pre 71 L                                    

 

             FVC-LLN      3.63 L                                  

 

             Fev1-Pred    3.49 L                                  

 

             Fev1-Pre     1.33 L                                  

 

             Fev1-%Pred-Pre 37 L                                    

 

             Fev1-LLN     2.79 L                                  

 

             Fev6-Pred    4.30 L                                  

 

             Fev6-Pre     3.16 L                                  

 

             Fev6-%Pred-Pre 73 L                                    

 

             Fev6-LLN     3.50 L                                  

 

             Fev1fvc-Pred 78 %                                    

 

             Fev1fvc-Pre  42 %                                    

 

             Iib4cwv-%Pred-Pre 53 %                                    

 

             Wod4umv-TSS  68 %                                    

 

             Fev6fvc-Pred 97 %                                    

 

             Fev6fvc-Pre  100 %                                   

 

             Lux1iuz-%Pred-Pre 103 %                                   

 

             FEFMax-Pred  9.04 L/E/sec                            

 

             FEFMax-Pre   2.09 L/E/sec                            

 

             FEFMax-%Pred-Pre 23 L/E/sec                              

 

             FEFMax-LLN   6.98 L/E/sec                            

 

             Fef2575-Pred 3.21 L/E/sec                            

 

             Fef2575-Pre  0.63 L/E/sec                            

 

             Jhi1824-%Pred-Pre 19 L/E/sec                              

 

             Crc1206-KHE  1.79 L/E/sec                            

 

             ExpTime-Pre  6.15 sec                                

 

             Fev1fev6-Pred 81 %                                    

 

             Fev1fev6-Pre 42 %                                    

 

             Uop7sdd2-%Pred-Pre 52 %                                    

 

             Hyx9rff5-BBI 72 %                                    







Procedures





                Date            Code            Description     Status

 

                2021      27601           Tobacco Cessation Counseling Com

pleted

 

                2021      68263           Office/Outpatient New Moderate M

DM 45-59 Minutes Completed

 

                2021      66941           Spirometry      Completed







Medical Devices





                                        Description

 

                                        No Information Available







Encounters





           Type       Date       Location   Provider   Dx         Diagnosis

 

             Office Visit 2021 10:00a [facility] Celina To

BRUNA mantilla 

J43.1                                   Panlobular emphysema

 

                          F17.218                   Nicotine dependence, cigaret

kelsey, w oth disorders

 

                          J47.9                     Bronchiectasis, uncomplicate

d

 

                          G47.33                    Obstructive sleep apnea (yanelis

lt) (pediatric)







Assessments





                Date            Code            Description     Provider

 

                10/15/2021      J43.1           Panlobular emphysema Pulmonary L

ab

 

                2021      J43.1           Panlobular emphysema BRUNA Naik

 

                    2021          F17.218             Nicotine dependence,

 cigarettes, with other nicotine-induced 

disorders                               BRUNA Naik

 

                2021      J47.9           Bronchiectasis, uncomplicated BRUNA Cain

 

                2021      G47.33          Obstructive sleep apnea (adult) 

(pediatric) BRUNA Naik







Plan of Treatment

2021 - BRUNA Naik* J43.1 Panlobular emphysema

* F17.218 Nicotine dependence, cigarettes, with other nicotine-induced disorders

* J47.9 Bronchiectasis, uncomplicated

* G47.33 Obstructive sleep apnea (adult) (pediatric)

* * Comments:* 90 minutes of time were spent reviewing extensive office notes, 
  hospital notes, and testing, examining the patient, interviewing the patient, 
  discussing the case with the doctor, reviewing imaging, explaining the plan to
  the patient, and dictating.



* Follow up:* Return in 1-2 months with PFT









Functional Status





                Functional Condition Comment         Date            Status

 

                Independent with all ADL's                                 Activ

e







Mental Status





                                        Description

 

                                        No Information Available







Referrals





                Refer to      Reason for Referral Status          Appt Celina Hendricks, A.N.PKristopher COPD            Scheduled       2021

 

                                        [facility]

 

                                        [address]

 

                                        [place], [place] [id number] [phone]
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
## 2021-10-18 NOTE — CCD
Continuity of Care Document (CCD)

                             Created on: 10/15/2021



Christiano Ankur DIMAS

External Reference #: [id number]

: 1970

Sex: Male



Demographics





                          Address                   [address] [place] [address] [address]

[place]  [address]

 

                          Home Phone                [phone]

 

                          Preferred Language        Unknown

 

                          Marital Status            Unknown

 

                          Shinto Affiliation     Unknown

 

                          Race                      White

 

                          Ethnic Group              Not  or 





Author





                          Author                    [facility]

 

                          Organization              Unknown

 

                          Address                   [address]

[place]  [address]



 

                          Phone                     [phone]







Care Team Providers





                    Care Team Member Name Role                Phone

 

                    Martínez Macdonald M.D. AUTM                [phone]

 

                    Mildred Galvan D.O.   AUTM                [phone]

 

                    Castillo Russell M.D. AUTM                [phone]

 

                          AUTM                      Unavailable







Problems





                                        Description

 

                                        No Information Available







Social History





                Type            Date            Description     Comments

 

                Birth Sex                       Unknown          

 

                Smokeless Tobacco                 Former Smokeless Tobacco User,

 Used 1 time Daily  

 

                ETOH Use                        Drinks a couple Beers a day  

 

                Tobacco Use     Start: 89 Patient is a current smoker, smo

kes every day 1 ppd 

x 30 yrs 

 

                Smoking Status  Reviewed: 23 Patient is a current smoker, 

smokes every day 

1 ppd x 30 yrs 

 

                Recent Travel                   He has traveled to the [facility] and [facility]  







Allergies and adverse reactions





                                        Description

 

                                        No Known Drug Allergies







Medications





           Active Medications SIG        Qnty       Indications Ordering Provide

r Date

 

                          Anoro Ellipta                     62.5-25mcg/Inh Aeros

ol                   1 

puff every day  180units                        BRUNA Naik 2018

 

                          Aspir-81                     81mg Tablets DR          

         1 by mouth every 

day                                             Unknown         

 

                          Aleve                     220mg Tablets               

    1 tab by mouth as 

needed                                          Unknown         

 

                                        Albuterol Sulfate HFA                   

  108(90Base) mcg/Act Aerosol           

             2 puffs four times a day as needed 25.5gm                    BRUNA Navarrete 







Immunizations





                                        Description

 

                                        No Information Available







Vital Signs





                Date            Vital           Result          Comment

 

                2021 10:00am BP Systolic     120 mmHg         

 

                    BP Diastolic        84 mmHg              

 

                    Heart Rate          83 /min              

 

                    O2 % BldC Oximetry  96 %                 

 

                    Height              66 inches           5'6"

 

                    Weight              132.00 lb            

 

                    BMI (Body Mass Index) 21.3 kg/m2           

 

                    Ideal Body Weight   142 lb               

 

                    Weight              59.875 kg            

 

                    BSA (Body Surface Area) 1.68 m2              

 

                2019 11:14am BP Systolic     142 mmHg         

 

                    BP Diastolic        66 mmHg              

 

                    Heart Rate          92 /min              

 

                    O2 % BldC Oximetry  99 %                 

 

                    Respiratory Rate    18 /min              

 

                    Body Temperature    98.8 F             

 

                    Height              66 inches           5'6"

 

                    Ideal Body Weight   142 lb               







Results





        Test    Acquired Date Facility Test    Result  H/L     Range   Note

 

                    FVL/Arsen           2021          [facility]

             PDFReport    SEE IMAGE                               

 

             FVC-Pred     4.46 L                                  

 

             FVC-Pre      3.17 L                                  

 

             FVC-%Pred-Pre 71 L                                    

 

             FVC-LLN      3.63 L                                  

 

             Fev1-Pred    3.49 L                                  

 

             Fev1-Pre     1.33 L                                  

 

             Fev1-%Pred-Pre 37 L                                    

 

             Fev1-LLN     2.79 L                                  

 

             Fev6-Pred    4.30 L                                  

 

             Fev6-Pre     3.16 L                                  

 

             Fev6-%Pred-Pre 73 L                                    

 

             Fev6-LLN     3.50 L                                  

 

             Fev1fvc-Pred 78 %                                    

 

             Fev1fvc-Pre  42 %                                    

 

             Imd6ipy-%Pred-Pre 53 %                                    

 

             Loi8mkw-BBL  68 %                                    

 

             Fev6fvc-Pred 97 %                                    

 

             Fev6fvc-Pre  100 %                                   

 

             Hrc4qhv-%Pred-Pre 103 %                                   

 

             FEFMax-Pred  9.04 L/E/sec                            

 

             FEFMax-Pre   2.09 L/E/sec                            

 

             FEFMax-%Pred-Pre 23 L/E/sec                              

 

             FEFMax-LLN   6.98 L/E/sec                            

 

             Fef2575-Pred 3.21 L/E/sec                            

 

             Fef2575-Pre  0.63 L/E/sec                            

 

             Drn5351-%Pred-Pre 19 L/E/sec                              

 

             Pjs0862-NXI  1.79 L/E/sec                            

 

             ExpTime-Pre  6.15 sec                                

 

             Fev1fev6-Pred 81 %                                    

 

             Fev1fev6-Pre 42 %                                    

 

             Ukw9mqo6-%Pred-Pre 52 %                                    

 

             Eah9lfk8-GJM 72 %                                    







Procedures





                Date            Code            Description     Status

 

                2021      80618           Tobacco Cessation Counseling Com

pleted

 

                2021      36398           Office/Outpatient New Moderate M

DM 45-59 Minutes Completed

 

                2021      32814           Spirometry      Completed







Medical Devices





                                        Description

 

                                        No Information Available







Encounters





           Type       Date       Location   Provider   Dx         Diagnosis

 

             Office Visit 2021 10:00a [facility] Celina To

BRUNA mantilla 

J43.1                                   Panlobular emphysema

 

                          F17.218                   Nicotine dependence, cigaret

kelsey, w oth disorders

 

                          J47.9                     Bronchiectasis, uncomplicate

d

 

                          G47.33                    Obstructive sleep apnea (yanelis

lt) (pediatric)







Assessments





                Date            Code            Description     Provider

 

                10/15/2021      J43.1           Panlobular emphysema Pulmonary L

ab

 

                2021      J43.1           Panlobular emphysema BRUNA Naik

 

                    2021          F17.218             Nicotine dependence,

 cigarettes, with other nicotine-induced 

disorders                               BRUNA Naik

 

                2021      J47.9           Bronchiectasis, uncomplicated BRUNA Cain

 

                2021      G47.33          Obstructive sleep apnea (adult) 

(pediatric) BRUNA Naik







Plan of Treatment

2021 - BRUNA Naik* J43.1 Panlobular emphysema

* F17.218 Nicotine dependence, cigarettes, with other nicotine-induced disorders

* J47.9 Bronchiectasis, uncomplicated

* G47.33 Obstructive sleep apnea (adult) (pediatric)

* * Comments:* 90 minutes of time were spent reviewing extensive office notes, 
  hospital notes, and testing, examining the patient, interviewing the patient, 
  discussing the case with the doctor, reviewing imaging, explaining the plan to
  the patient, and dictating.



* Follow up:* Return in 1-2 months with PFT









Functional Status





                Functional Condition Comment         Date            Status

 

                Independent with all ADL's                                 Activ

e







Mental Status





                                        Description

 

                                        No Information Available







Referrals





                Refer to      Reason for Referral Status          Appt Celina Hendricks, A.N.PKristopher COPD            Scheduled       2021

 

                                        [facility]

 

                                        [address]

 

                                        [place], [place] [id number] [phone]
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
## 2021-10-18 NOTE — CCD
Continuity of Care Document (CCD)

                             Created on: 10/15/2021



Christiano Ankur DIMAS

External Reference #: [id number]

: 1970

Sex: Male



Demographics





                          Address                   [address] [place] [address] DR

[place]  [address]

 

                          Home Phone                [phone]

 

                          Preferred Language        Unknown

 

                          Marital Status            Unknown

 

                          Worship Affiliation     Unknown

 

                          Race                      White

 

                          Ethnic Group              Not  or 





Author





                          Author                    [facility]

 

                          Organization              Unknown

 

                          Address                   [address]

[place]  [address]



 

                          Phone                     [phone]







Care Team Providers





                    Care Team Member Name Role                Phone

 

                    Martínez Macdonald M.D. AUTM                [phone]

 

                    Mildred Galvan D.O.   AUTM                [phone]

 

                    Castillo Russell M.D. AUTM                [phone]

 

                          AUTM                      Unavailable







Problems





                                        Description

 

                                        No Information Available







Social History





                Type            Date            Description     Comments

 

                Birth Sex                       Unknown          

 

                Smokeless Tobacco                 Former Smokeless Tobacco User,

 Used 1 time Daily  

 

                ETOH Use                        Drinks a couple Beers a day  

 

                Tobacco Use     Start: 89 Patient is a current smoker, smo

kes every day 1 ppd 

x 30 yrs 

 

                Smoking Status  Reviewed: 21 Patient is a current smoker, 

smokes every day 

1 ppd x 30 yrs 

 

                Recent Travel                   He has traveled to the [facility] and [facility].S.  







Allergies and adverse reactions





                                        Description

 

                                        No Known Drug Allergies







Medications





           Active Medications SIG        Qnty       Indications Ordering Provide

r Date

 

                          Anoro Ellipta                     62.5-25mcg/Inh Aeros

ol                   1 

puff every day  180units                        BRUNA Naki 2018

 

                          Aspir-81                     81mg Tablets DR          

         1 by mouth every 

day                                             Unknown         

 

                          Aleve                     220mg Tablets               

    1 tab by mouth as 

needed                                          Unknown         

 

                                        Albuterol Sulfate HFA                   

  108(90Base) mcg/Act Aerosol           

             2 puffs four times a day as needed 25.5gm                    BRUNA Navarrete 







Immunizations





                                        Description

 

                                        No Information Available







Vital Signs





                Date            Vital           Result          Comment

 

                2021 10:00am BP Systolic     120 mmHg         

 

                    BP Diastolic        84 mmHg              

 

                    Heart Rate          83 /min              

 

                    O2 % BldC Oximetry  96 %                 

 

                    Height              66 inches           5'6"

 

                    Weight              132.00 lb            

 

                    BMI (Body Mass Index) 21.3 kg/m2           

 

                    Ideal Body Weight   142 lb               

 

                    Weight              59.875 kg            

 

                    BSA (Body Surface Area) 1.68 m2              

 

                2019 11:14am BP Systolic     142 mmHg         

 

                    BP Diastolic        66 mmHg              

 

                    Heart Rate          92 /min              

 

                    O2 % BldC Oximetry  99 %                 

 

                    Respiratory Rate    18 /min              

 

                    Body Temperature    98.8 F             

 

                    Height              66 inches           5'6"

 

                    Ideal Body Weight   142 lb               







Results





        Test    Acquired Date Facility Test    Result  H/L     Range   Note

 

                    FVL/Arsen           2021          [facility]

             PDFReport    SEE IMAGE                               

 

             FVC-Pred     4.46 L                                  

 

             FVC-Pre      3.17 L                                  

 

             FVC-%Pred-Pre 71 L                                    

 

             FVC-LLN      3.63 L                                  

 

             Fev1-Pred    3.49 L                                  

 

             Fev1-Pre     1.33 L                                  

 

             Fev1-%Pred-Pre 37 L                                    

 

             Fev1-LLN     2.79 L                                  

 

             Fev6-Pred    4.30 L                                  

 

             Fev6-Pre     3.16 L                                  

 

             Fev6-%Pred-Pre 73 L                                    

 

             Fev6-LLN     3.50 L                                  

 

             Fev1fvc-Pred 78 %                                    

 

             Fev1fvc-Pre  42 %                                    

 

             Xet7msr-%Pred-Pre 53 %                                    

 

             Tyv6hiq-FNT  68 %                                    

 

             Fev6fvc-Pred 97 %                                    

 

             Fev6fvc-Pre  100 %                                   

 

             Iaq2vex-%Pred-Pre 103 %                                   

 

             FEFMax-Pred  9.04 L/E/sec                            

 

             FEFMax-Pre   2.09 L/E/sec                            

 

             FEFMax-%Pred-Pre 23 L/E/sec                              

 

             FEFMax-LLN   6.98 L/E/sec                            

 

             Fef2575-Pred 3.21 L/E/sec                            

 

             Fef2575-Pre  0.63 L/E/sec                            

 

             Bfk1812-%Pred-Pre 19 L/E/sec                              

 

             Hnt5785-VQV  1.79 L/E/sec                            

 

             ExpTime-Pre  6.15 sec                                

 

             Fev1fev6-Pred 81 %                                    

 

             Fev1fev6-Pre 42 %                                    

 

             Qaq3dzo5-%Pred-Pre 52 %                                    

 

             Pnv3meh6-YLZ 72 %                                    







Procedures





                Date            Code            Description     Status

 

                2021      09868           Tobacco Cessation Counseling Com

pleted

 

                2021      21816           Office/Outpatient New Moderate M

DM 45-59 Minutes Completed

 

                2021      10953           Spirometry      Completed







Medical Devices





                                        Description

 

                                        No Information Available







Encounters





           Type       Date       Location   Provider   Dx         Diagnosis

 

             Office Visit 2021 10:00a [facility] Pulmonary/Thoracic Celina To

BRUNA mantilla 

J43.1                                   Panlobular emphysema

 

                          F17.218                   Nicotine dependence, cigaret

kelsey, w oth disorders

 

                          J47.9                     Bronchiectasis, uncomplicate

d

 

                          G47.33                    Obstructive sleep apnea (yanelis

lt) (pediatric)







Assessments





                Date            Code            Description     Provider

 

                10/15/2021      J43.1           Panlobular emphysema Pulmonary L

ab

 

                2021      J43.1           Panlobular emphysema BRUNA Naik

 

                    2021          F17.218             Nicotine dependence,

 cigarettes, with other nicotine-induced 

disorders                               BRUNA Naik

 

                2021      J47.9           Bronchiectasis, uncomplicated BRUNA Cain

 

                2021      G47.33          Obstructive sleep apnea (adult) 

(pediatric) BRUNA Naik







Plan of Treatment

2021 - BRUNA Naik* J43.1 Panlobular emphysema

* F17.218 Nicotine dependence, cigarettes, with other nicotine-induced disorders

* J47.9 Bronchiectasis, uncomplicated

* G47.33 Obstructive sleep apnea (adult) (pediatric)

* * Comments:* 90 minutes of time were spent reviewing extensive office notes, 
  hospital notes, and testing, examining the patient, interviewing the patient, 
  discussing the case with the doctor, reviewing imaging, explaining the plan to
  the patient, and dictating.



* Follow up:* Return in 1-2 months with PFT









Functional Status





                Functional Condition Comment         Date            Status

 

                Independent with all ADL's                                 Activ

e







Mental Status





                                        Description

 

                                        No Information Available







Referrals





                Refer to      Reason for Referral Status          Appt Celina Hendricks, A.N.PKristopher COPD            Scheduled       2021

 

                                        [facility]

 

                                        [address]

 

                                        [place], [place] [id number] [phone]

## 2021-10-18 NOTE — CCD
Continuity of Care Document (CCD)

                             Created on: 2021



Ankur Alvarado

External Reference #: [id number]

: 1970

Sex: Male



Demographics





                          Address                   [address] [place] [address] 

[place]  [address]

 

                          Home Phone                [phone]

 

                          Preferred Language        Unknown

 

                          Marital Status            Unknown

 

                          Taoism Affiliation     Unknown

 

                          Race                      White

 

                          Ethnic Group              Not  or 





Author





                          Author                    Ankur PUGA ANP

 

                          Organization              Unknown

 

                          Address                   [address]

[place]  [address]



 

                          Phone                     [phone]







Care Team Providers





                    Care Team Member Name Role                Phone

 

                    Alexandru Navarrete MD    AUTM                Unavailable

 

                    Martínez Macdonald M.D. AUTM                [phone]

 

                    Mildred Galvan D.O.   AUTM                [phone]

 

                    Castillo Russell M.D. AUTM                [phone]

 

                          AUTM                      Unavailable







Problems





                                        Description

 

                                        No Information Available







Social History





                Type            Date            Description     Comments

 

                Birth Sex                       Unknown          

 

                Smokeless Tobacco                 Former Smokeless Tobacco User,

 Used 1 time Daily  

 

                ETOH Use                        Drinks a couple Beers a day  

 

                Tobacco Use     Start: 89 Patient is a current smoker, smo

kes every day 1 ppd 

x 30 yrs 

 

                Smoking Status  Reviewed: 21 Patient is a current smoker, 

smokes every day 

1 ppd x 30 yrs 

 

                Recent Travel                   He has traveled to the [facility] and [facility].S.  







Allergies, Adverse Reactions, Alerts





                                        Description

 

                                        No Known Drug Allergies







Medications





           Active Medications SIG        Qnty       Indications Ordering Provide

r Date

 

                          Anoro Ellipta                     62.5-25mcg/Inh Aeros

ol                   1 

puff every day  180units                        BRUNA Naik 2018

 

                          Aspir-81                     81mg Tablets DR          

         1 by mouth every 

day                                             Unknown         

 

                          Aleve                     220mg Tablets               

    1 tab by mouth as 

needed                                          Unknown         

 

                                        Albuterol Sulfate HFA                   

  108(90Base) mcg/Act Aerosol           

             2 puffs four times a day as needed 25.5gm                    BRUNA Navarrete 







Immunizations





                                        Description

 

                                        No Information Available







Vital Signs





                Date            Vital           Result          Comment

 

                2021 10:00am BP Systolic     120 mmHg         

 

                    BP Diastolic        84 mmHg              

 

                    Heart Rate          83 /min              

 

                    O2 % BldC Oximetry  96 %                 

 

                    Height              66 inches           5'6"

 

                    Weight              132.00 lb            

 

                    BMI (Body Mass Index) 21.3 kg/m2           

 

                    Ideal Body Weight   142 lb               

 

                    Weight              59.875 kg            

 

                    BSA (Body Surface Area) 1.68 m2              

 

                2019 11:14am BP Systolic     142 mmHg         

 

                    BP Diastolic        66 mmHg              

 

                    Heart Rate          92 /min              

 

                    O2 % BldC Oximetry  99 %                 

 

                    Respiratory Rate    18 /min              

 

                    Body Temperature    98.8 F             

 

                    Height              66 inches           5'6"

 

                    Ideal Body Weight   142 lb               







Results





        Test    Acquired Date Facility Test    Result  H/L     Range   Note

 

                    FVL/Arsen           2021          [facility]

             PDFReport    SEE IMAGE                               

 

             FVC-Pred     4.46 L                                  

 

             FVC-Pre      3.17 L                                  

 

             FVC-%Pred-Pre 71 L                                    

 

             FVC-LLN      3.63 L                                  

 

             Fev1-Pred    3.49 L                                  

 

             Fev1-Pre     1.33 L                                  

 

             Fev1-%Pred-Pre 37 L                                    

 

             Fev1-LLN     2.79 L                                  

 

             Fev6-Pred    4.30 L                                  

 

             Fev6-Pre     3.16 L                                  

 

             Fev6-%Pred-Pre 73 L                                    

 

             Fev6-LLN     3.50 L                                  

 

             Fev1fvc-Pred 78 %                                    

 

             Fev1fvc-Pre  42 %                                    

 

             Hga5orn-%Pred-Pre 53 %                                    

 

             Evh5qge-SNP  68 %                                    

 

             Fev6fvc-Pred 97 %                                    

 

             Fev6fvc-Pre  100 %                                   

 

             Zvo6vyh-%Pred-Pre 103 %                                   

 

             FEFMax-Pred  9.04 L/E/sec                            

 

             FEFMax-Pre   2.09 L/E/sec                            

 

             FEFMax-%Pred-Pre 23 L/E/sec                              

 

             FEFMax-LLN   6.98 L/E/sec                            

 

             Fef2575-Pred 3.21 L/E/sec                            

 

             Fef2575-Pre  0.63 L/E/sec                            

 

             Zzo3453-%Pred-Pre 19 L/E/sec                              

 

             Fxg2603-CJF  1.79 L/E/sec                            

 

             ExpTime-Pre  6.15 sec                                

 

             Fev1fev6-Pred 81 %                                    

 

             Fev1fev6-Pre 42 %                                    

 

             Lmq9pxc8-%Pred-Pre 52 %                                    

 

             Qix5hyr5-GTN 72 %                                    







Procedures





                                        Description

 

                                        No Information Available







Medical Devices





                                        Description

 

                                        No Information Available







Encounters





                                        Description

 

                                        No Information Available







Assessments





                Date            Code            Description     Provider

 

                2021      J44.9           Chronic obstructive pulmonary di

sease, unspecified Celina Puga, 

BRUNA

 

                2021      J43.1           Panlobular emphysema BRUNA Naik

 

                    2021          F17.218             Nicotine dependence,

 cigarettes, with other nicotine-induced 

disorders                               BRUNA Naik

 

                2021      J47.9           Bronchiectasis, uncomplicated BRUNA Cain







Plan of Treatment

Future Appointment(s):* 10/15/2021  2:00 pm - Pulmonary Lab at [facility] 
  Pulmonary/Thoracic

2021 - BRUNA Naik* J44.9 Chronic obstructive pulmonary disease, 
  unspecified

* J43.1 Panlobular emphysema

* F17.218 Nicotine dependence, cigarettes, with other nicotine-induced disorders

* J47.9 Bronchiectasis, uncomplicated

* * New Labs:* PFT W/HGB On Meds, Ordered: 21



* Follow up:* Return in 1-2 months with PFT









Functional Status





                Functional Condition Comment         Date            Status

 

                Independent with all ADL's                                 Activ

e







Mental Status





                                        Description

 

                                        No Information Available







Referrals





                Refer to Dr     Reason for Referral Status          Appt Date

 

                Celina Puga, A.NKristopherPKristopher COPD            Scheduled       2021

 

                                        [facility] Pulmonary

 

                                        [address]

 

                                        [place], [place] [address]

 

                                        [phone]

 

                                          

 

                Mildred Galvan D.O. COPD            Created         

 

                                        [facility]

 

                                        [address] [facility], .. [address]

 

                                        [phone]

## 2021-10-18 NOTE — CCD
Continuity of Care Document (CCD)

                             Created on: 10/15/2021



Christiano Ankur DIMAS

External Reference #: [id number]

: 1970

Sex: Male



Demographics





                          Address                   [address] [place] [address] [address]

[place]  [address]

 

                          Home Phone                [phone]

 

                          Preferred Language        Unknown

 

                          Marital Status            Unknown

 

                          Taoist Affiliation     Unknown

 

                          Race                      White

 

                          Ethnic Group              Not  or 





Author





                          Author                    [facility]

 

                          Organization              Unknown

 

                          Address                   [address]

[place]  [address]



 

                          Phone                     [phone]







Care Team Providers





                    Care Team Member Name Role                Phone

 

                    Martínez Macdonald M.D. AUTM                [phone]

 

                    Mildred Galvan D.O.   AUTM                [phone]

 

                    Castillo Russell M.D. AUTM                [phone]

 

                          AUTM                      Unavailable







Problems





                                        Description

 

                                        No Information Available







Social History





                Type            Date            Description     Comments

 

                Birth Sex                       Unknown          

 

                Smokeless Tobacco                 Former Smokeless Tobacco User,

 Used 1 time Daily  

 

                ETOH Use                        Drinks a couple Beers a day  

 

                Tobacco Use     Start: 89 Patient is a current smoker, smo

kes every day 1 ppd 

x 30 yrs 

 

                Smoking Status  Reviewed: 21 Patient is a current smoker, 

smokes every day 

1 ppd x 30 yrs 

 

                Recent Travel                   He has traveled to the [facility] and [facility].S.  







Allergies and adverse reactions





                                        Description

 

                                        No Known Drug Allergies







Medications





           Active Medications SIG        Qnty       Indications Ordering Provide

r Date

 

                          Anoro Ellipta                     62.5-25mcg/Inh Aeros

ol                   1 

puff every day  180units                        BRUNA Naik 2018

 

                          Aspir-81                     81mg Tablets DR          

         1 by mouth every 

day                                             Unknown         

 

                          Aleve                     220mg Tablets               

    1 tab by mouth as 

needed                                          Unknown         

 

                                        Albuterol Sulfate HFA                   

  108(90Base) mcg/Act Aerosol           

             2 puffs four times a day as needed 25.5gm                    BRUNA Navarrete 







Immunizations





                                        Description

 

                                        No Information Available







Vital Signs





                Date            Vital           Result          Comment

 

                2021 10:00am BP Systolic     120 mmHg         

 

                    BP Diastolic        84 mmHg              

 

                    Heart Rate          83 /min              

 

                    O2 % BldC Oximetry  96 %                 

 

                    Height              66 inches           5'6"

 

                    Weight              132.00 lb            

 

                    BMI (Body Mass Index) 21.3 kg/m2           

 

                    Ideal Body Weight   142 lb               

 

                    Weight              59.875 kg            

 

                    BSA (Body Surface Area) 1.68 m2              

 

                2019 11:14am BP Systolic     142 mmHg         

 

                    BP Diastolic        66 mmHg              

 

                    Heart Rate          92 /min              

 

                    O2 % BldC Oximetry  99 %                 

 

                    Respiratory Rate    18 /min              

 

                    Body Temperature    98.8 F             

 

                    Height              66 inches           5'6"

 

                    Ideal Body Weight   142 lb               







Results





        Test    Acquired Date Facility Test    Result  H/L     Range   Note

 

                    FVL/Arsen           2021          [facility]

             PDFReport    SEE IMAGE                               

 

             FVC-Pred     4.46 L                                  

 

             FVC-Pre      3.17 L                                  

 

             FVC-%Pred-Pre 71 L                                    

 

             FVC-LLN      3.63 L                                  

 

             Fev1-Pred    3.49 L                                  

 

             Fev1-Pre     1.33 L                                  

 

             Fev1-%Pred-Pre 37 L                                    

 

             Fev1-LLN     2.79 L                                  

 

             Fev6-Pred    4.30 L                                  

 

             Fev6-Pre     3.16 L                                  

 

             Fev6-%Pred-Pre 73 L                                    

 

             Fev6-LLN     3.50 L                                  

 

             Fev1fvc-Pred 78 %                                    

 

             Fev1fvc-Pre  42 %                                    

 

             Olj0rpb-%Pred-Pre 53 %                                    

 

             Mws8lei-TPL  68 %                                    

 

             Fev6fvc-Pred 97 %                                    

 

             Fev6fvc-Pre  100 %                                   

 

             Wmr5atu-%Pred-Pre 103 %                                   

 

             FEFMax-Pred  9.04 L/E/sec                            

 

             FEFMax-Pre   2.09 L/E/sec                            

 

             FEFMax-%Pred-Pre 23 L/E/sec                              

 

             FEFMax-LLN   6.98 L/E/sec                            

 

             Fef2575-Pred 3.21 L/E/sec                            

 

             Fef2575-Pre  0.63 L/E/sec                            

 

             Pws0976-%Pred-Pre 19 L/E/sec                              

 

             Ljw4246-TKT  1.79 L/E/sec                            

 

             ExpTime-Pre  6.15 sec                                

 

             Fev1fev6-Pred 81 %                                    

 

             Fev1fev6-Pre 42 %                                    

 

             Zws9yll1-%Pred-Pre 52 %                                    

 

             Hpq0ful0-TQJ 72 %                                    







Procedures





                Date            Code            Description     Status

 

                2021      83085           Tobacco Cessation Counseling Com

pleted

 

                2021      75568           Office/Outpatient New Moderate M

DM 45-59 Minutes Completed

 

                2021      14796           Spirometry      Completed







Medical Devices





                                        Description

 

                                        No Information Available







Encounters





           Type       Date       Location   Provider   Dx         Diagnosis

 

             Office Visit 2021 10:00a [facility] Pulmonary/Thoracic Celina To

BRUNA mantilla 

J43.1                                   Panlobular emphysema

 

                          F17.218                   Nicotine dependence, cigaret

kelsey, w oth disorders

 

                          J47.9                     Bronchiectasis, uncomplicate

d

 

                          G47.33                    Obstructive sleep apnea (yanelis

lt) (pediatric)







Assessments





                Date            Code            Description     Provider

 

                10/15/2021      J43.1           Panlobular emphysema Pulmonary L

ab

 

                2021      J43.1           Panlobular emphysema BRUNA Naik

 

                    2021          F17.218             Nicotine dependence,

 cigarettes, with other nicotine-induced 

disorders                               BRUNA Naik

 

                2021      J47.9           Bronchiectasis, uncomplicated BRUNA Cain

 

                2021      G47.33          Obstructive sleep apnea (adult) 

(pediatric) BRUNA Naik







Plan of Treatment

2021 - BRUNA Naik* J43.1 Panlobular emphysema

* F17.218 Nicotine dependence, cigarettes, with other nicotine-induced disorders

* J47.9 Bronchiectasis, uncomplicated

* G47.33 Obstructive sleep apnea (adult) (pediatric)

* * Comments:* 90 minutes of time were spent reviewing extensive office notes, 
  hospital notes, and testing, examining the patient, interviewing the patient, 
  discussing the case with the doctor, reviewing imaging, explaining the plan to
  the patient, and dictating.



* Follow up:* Return in 1-2 months with PFT









Functional Status





                Functional Condition Comment         Date            Status

 

                Independent with all ADL's                                 Activ

e







Mental Status





                                        Description

 

                                        No Information Available







Referrals





                Refer to      Reason for Referral Status          Appt Celina Hendricks, A.N.PKristopher COPD            Scheduled       2021

 

                                        [facility]

 

                                        [address]

 

                                        [place], [place] [address] [phone]

## 2021-10-18 NOTE — CCD
Continuity of Care Document (CCD)

                             Created on: 10/04/2021



Ankur Alvarado

External Reference #: [id number]

: 1970

Sex: Male



Demographics





                          Address                   [address] [place] [address] 

[place]  [address]

 

                          Home Phone                [phone]

 

                          Preferred Language        Unknown

 

                          Marital Status            Unknown

 

                          Denominational Affiliation     Unknown

 

                          Race                      White

 

                          Ethnic Group              Not  or 





Author





                          Author                    Ankur PUGA ANP

 

                          Organization              Unknown

 

                          Address                   [address]

[place]  [address]



 

                          Phone                     [phone]







Care Team Providers





                    Care Team Member Name Role                Phone

 

                    Alexandru Navarrete MD    AUTM                Unavailable

 

                    Martínez Macdonald M.D. AUTM                [phone]

 

                    Mildred Galvan D.O.   AUTM                [phone]

 

                    Castillo Russell M.D. AUTM                [phone]

 

                          AUTM                      Unavailable







Problems





                                        Description

 

                                        No Information Available







Social History





                Type            Date            Description     Comments

 

                Birth Sex                       Unknown          

 

                Smokeless Tobacco                 Former Smokeless Tobacco User,

 Used 1 time Daily  

 

                ETOH Use                        Drinks a couple Beers a day  

 

                Tobacco Use     Start: 89 Patient is a current smoker, smo

kes every day 1 ppd 

x 30 yrs 

 

                Smoking Status  Reviewed: 21 Patient is a current smoker, 

smokes every day 

1 ppd x 30 yrs 

 

                Recent Travel                   He has traveled to the [facility] and [facility].S.  







Allergies, Adverse Reactions, Alerts





                                        Description

 

                                        No Known Drug Allergies







Medications





           Active Medications SIG        Qnty       Indications Ordering Provide

r Date

 

                          Anoro Ellipta                     62.5-25mcg/Inh Aeros

ol                   1 

puff every day  180units                        BRUNA Naik 2018

 

                          Aspir-81                     81mg Tablets DR          

         1 by mouth every 

day                                             Unknown         

 

                          Aleve                     220mg Tablets               

    1 tab by mouth as 

needed                                          Unknown         

 

                                        Albuterol Sulfate HFA                   

  108(90Base) mcg/Act Aerosol           

             2 puffs four times a day as needed 25.5gm                    BRUNA Navarrete 







Immunizations





                                        Description

 

                                        No Information Available







Vital Signs





                Date            Vital           Result          Comment

 

                2021 10:00am BP Systolic     120 mmHg         

 

                    BP Diastolic        84 mmHg              

 

                    Heart Rate          83 /min              

 

                    O2 % BldC Oximetry  96 %                 

 

                    Height              66 inches           5'6"

 

                    Weight              132.00 lb            

 

                    BMI (Body Mass Index) 21.3 kg/m2           

 

                    Ideal Body Weight   142 lb               

 

                    Weight              59.875 kg            

 

                    BSA (Body Surface Area) 1.68 m2              

 

                2019 11:14am BP Systolic     142 mmHg         

 

                    BP Diastolic        66 mmHg              

 

                    Heart Rate          92 /min              

 

                    O2 % BldC Oximetry  99 %                 

 

                    Respiratory Rate    18 /min              

 

                    Body Temperature    98.8 F             

 

                    Height              66 inches           5'6"

 

                    Ideal Body Weight   142 lb               







Results





        Test    Acquired Date Facility Test    Result  H/L     Range   Note

 

                    FVL/Arsen           2021          [facility]

             PDFReport    SEE IMAGE                               

 

             FVC-Pred     4.46 L                                  

 

             FVC-Pre      3.17 L                                  

 

             FVC-%Pred-Pre 71 L                                    

 

             FVC-LLN      3.63 L                                  

 

             Fev1-Pred    3.49 L                                  

 

             Fev1-Pre     1.33 L                                  

 

             Fev1-%Pred-Pre 37 L                                    

 

             Fev1-LLN     2.79 L                                  

 

             Fev6-Pred    4.30 L                                  

 

             Fev6-Pre     3.16 L                                  

 

             Fev6-%Pred-Pre 73 L                                    

 

             Fev6-LLN     3.50 L                                  

 

             Fev1fvc-Pred 78 %                                    

 

             Fev1fvc-Pre  42 %                                    

 

             Pox8xqy-%Pred-Pre 53 %                                    

 

             Xgv0moy-VGL  68 %                                    

 

             Fev6fvc-Pred 97 %                                    

 

             Fev6fvc-Pre  100 %                                   

 

             Rjn6gag-%Pred-Pre 103 %                                   

 

             FEFMax-Pred  9.04 L/E/sec                            

 

             FEFMax-Pre   2.09 L/E/sec                            

 

             FEFMax-%Pred-Pre 23 L/E/sec                              

 

             FEFMax-LLN   6.98 L/E/sec                            

 

             Fef2575-Pred 3.21 L/E/sec                            

 

             Fef2575-Pre  0.63 L/E/sec                            

 

             Szh7411-%Pred-Pre 19 L/E/sec                              

 

             Xwt7715-ZDX  1.79 L/E/sec                            

 

             ExpTime-Pre  6.15 sec                                

 

             Fev1fev6-Pred 81 %                                    

 

             Fev1fev6-Pre 42 %                                    

 

             Lvx7cce3-%Pred-Pre 52 %                                    

 

             Htt2phu6-JVB 72 %                                    







Procedures





                Date            Code            Description     Status

 

                2021      14498           Tobacco Cessation Counseling Com

pleted

 

                2021      90861           Office/Outpatient New Moderate M

DM 45-59 Minutes Completed

 

                2021      14189           Spirometry      Completed







[facility] Devices





                                        Description

 

                                        No Information Available







Encounters





           Type       Date       Location   Provider   Dx         Diagnosis

 

             Office Visit 2021 10:00a [facility] Pulmonary/Thoracic Celina To

BRUNA mantilla 

J43.1                                   Panlobular emphysema

 

                          F17.218                   Nicotine dependence, cigaret

kelsey, w oth disorders

 

                          J47.9                     Bronchiectasis, uncomplicate

d

 

                          G47.33                    Obstructive sleep apnea (yanelis

lt) (pediatric)







Assessments





                Date            Code            Description     Provider

 

                2021      J43.1           Panlobular emphysema BRUNA Naik

 

                    2021          F17.218             Nicotine dependence,

 cigarettes, with other nicotine-induced 

disorders                               BRUNA Naik

 

                2021      J47.9           Bronchiectasis, uncomplicated BRUNA Cain

 

                2021      G47.33          Obstructive sleep apnea (adult) 

(pediatric) BRUNA Naik







Plan of Treatment

Future Appointment(s):* 10/15/2021  2:00 pm - Pulmonary Lab at [facility] 
  Pulmonary/Thoracic

2021 - BRUNA Naik* J43.1 Panlobular emphysema

* F17.218 Nicotine dependence, cigarettes, with other nicotine-induced disorders

* J47.9 Bronchiectasis, uncomplicated

* G47.33 Obstructive sleep apnea (adult) (pediatric)

* * Comments:* 90 minutes of time were spent reviewing extensive office notes, 
  hospital notes, and testing, examining the patient, interviewing the patient, 
  discussing the case with the doctor, reviewing imaging, explaining the plan to
  the patient, and dictating.



* Follow up:* Return in 1-2 months with PFT









Functional Status





                Functional Condition Comment         Date            Status

 

                Independent with all ADL's                                 Activ

e







Mental Status





                                        Description

 

                                        No Information Available







Referrals





                Refer to      Reason for Referral Status          Appt Celina Hendricks, A.N.P. COPD            Scheduled       2021

 

                                        [facility]

 

                                        [address]

 

                                        [place], [place] [address]

 

                                        [phone]

 

                                          

 

                Mildred Galvan D.O. COPD            Created         

 

                                        [facility]

 

                                        [address] [place] [address].. [address]

 

                                        [phone]

## 2021-10-18 NOTE — REP
INDICATION:

SOB



COMPARISON:

07/18/2021



TECHNIQUE:

Portable AP view of the chest



FINDINGS:

The mediastinum and cardiac silhouette are stable and within normal limits for

portable technique.  The lung fields demonstrate emphysematous changes without acute

consolidation, effusion, or pneumothorax.  Skeletal structures are intact.



IMPRESSION:

No acute cardiopulmonary process appreciated.





<Electronically signed by Jass Strong > 10/18/21 1419

## 2022-01-06 NOTE — REP
[address] [place] [address] Dr [facility] EMERGENCY DEPT  [address] [address] [phone]    Work/School Note    Date: 1/6/2022     To Whom It May concern:    Enma Guzman was evaluated by the following provider(s):  Attending Provider: Melissa Adams MD  Physician Assistant: Nicki Stewart virus is suspected. Per the CDC guidelines we recommend home isolation until the following conditions are all met:    1. At least five days have passed since symptoms first appeared and/or had a close exposure,   2. After home isolation for five days, wearing a mask around others for the next five days,  3. At least 24 have passed since last fever without the use of fever-reducing medications and  4.  Symptoms (eg cough, shortness of breath) have improved      Sincerely,          Shelley Calabrese, NP PA and lateral chest:

 

Comparison is 02/17/2019.

 

The previous two right thoracotomy tubes are no longer present.

Skin staples are again noted inferomedially in the right hemithorax, unchanged.

 

There is a right pleural fluid collection that has increased in size.

The left costophrenic angle is mildly effaced, unchanged.

 

The lung fields otherwise clear.  There is no pneumothorax.  No visible

pneumomediastinum.

Cardiac size is normal.  Shira, mediastinum, skeletal structures are

unremarkable.

 

 

Electronically Signed by

Cristian Gaston MD 02/18/2019 08:21 A

## 2022-03-30 ENCOUNTER — HOSPITAL ENCOUNTER (OUTPATIENT)
Dept: HOSPITAL 53 - M RAD | Age: 52
End: 2022-03-30
Attending: GENERAL ACUTE CARE HOSPITAL

## 2024-02-19 NOTE — IPNPDOC
Date Seen


The patient was seen on 7/19/21.





Progress Note


SUBJECTIVE: Mr. Alvarado is a pleasant 50 year old male sitting in the hospital bed 

when I walked into the room. He states that he feels much better now than he did

when he first came to the ED early this morning. He states that he was 

officially diagnosed with COPD at the [facility] [place]. He ran out of his 

maintenance inhaler (anoro ellipta) about 6 months ago. He has had to use his 

rescue inhaler 3+ times per day in the last two weeks. He currently has an 

oxygen saturation of 90% on 2L NC; he states that he does not use oxygen at 

home. He has a good appetite and ate a robust breakfast (sausage breakfast 

sandwich, coffee, and a bagel). He lives at home with his wife and their dogs 

and works as a . He denies fevers, chills, weight loss, chest pain, 

loss of appetite. He admits to fatigue.





OBJECTIVE


PHYSICAL EXAMINATION:


VITAL SIGNS: Please see below. 


GENERAL: a well nourished male sitting in no acute distress


HEENT: sclera nonicteric, poor dentition, mucous membranes moist, trachea 

midline, no lymphadenopathy


CARDIOVASCULAR: Heart sounds distant, regular rate and rhythm


RESPIRATORY: equal air intake bilaterally, distant breath sounds, expiratory 

wheezes appreciated in posterior bases bilaterally, no rhonchi or rales


ABDOMINAL: ventral hernia noted, soft, nontender to palpation in all four 

quadrants, no organomegaly


EXTREMITIES: no lower extremity edema noted on exam. no rashes or ulcerations. 

Tattoo noted on left upper extremity


PSYCHOLOGICAL: alert and oriented x 3, normal affect





LABORATORY DATA, IMAGING STUDIES, MICROBIOLOGY: Please see below.


Chest x-ray 7/18/21: Impression: 1. Question of minimal left pleural effusion. 

2. Otherwise stable chest since 03/22/2019 with evidence of COPD. 





Echocardiogram: 02/15/21: 1.  Small pericardial effusion over the lateral wall 

of the right ventricle and


anteriorly. No diastolic chamber collapse. No significant variation of 

intracardiac velocities. 2. Normal left ventricle internal dimensions, wall 

thickness, regional wall motion, wall thickening, systolic and diastolic 

function.


3. Normal right ventricle size and systolic function. 4. Normal size of the 

atria.





DVT prophylaxis ordered?: Yes continue lovenox





ASSESSMENT AND PLAN: Mr. Alvarado is a 50 year old male with pmhx of COPD, SONALI, 

polycythemia, hemochromatosis, and a heart murmur who presented to the ED with 

worsening shortness of breath of one days duration, was found to be hypoxic and 

was admitted to the hospital for acute hypoxic and hypercapneic respiratory 

failure and copd exacerbation. 





PROBLEMS:


Acute hypoxic/hypercapneic respiratory failure 2/2 to COPD exacerbation


-started solumedrol 40mg IV q 24hrs


-Patient has an oxygen saturation of 90% on 2L oxygen


-Duonebs ordered q 4 hours


-continue cefriaxone and azithromycin Day 1


-Continue pulmicort


-Patient is a current smoker and does not use oxygen at  home


-Will monitor patient WBC, today it is elevated at 14.6


-Procalcitonin is pending





Elevated lactic acid


-2.2 upon presentation to ED.


-Repeat 3.5 this afternoon after receiving a 1L bolus NS


-This is likely due to hypoxia.


- If the 4 hour follow up lactic acid is elevated I will order an ABG. 





SONALI


-Patient does not use a CPAP


-Patient had nocturnal polysomnography performed at [facility] 11/28/20- they 

recommended he return to the Sleep center for pressure therapy


-The patient states he was told he does not need a CPAP. 


-Right axis present on EKG





Tobacco use disorder


-patient currently smokes 10-15 cigarettes per day.


-He is not interested in quitting at this time


-Started smoking at age 16 and smoked 2ppd until 3 years ago.





Possible alcohol use disorder


-Patient states he drinks 1-2 beers per day but is unable to remember how much 

he drank yesterday. He believes it wasn't more than 12 beers


-Patient was put on CIWA protocol.


-Will review this tomorrow. Patient may not need to be on CIWA protocol. 





DVT prophylaxis: yes continue lovenox





DISPOSITION: Will continue to monitor patient's lactic acid level. This is 

likely secondary to hypoxia and not hypovolemia. He was given a 1L bolus of NS 

and did not have much improvement in his lactic acid level.





VS, I&O, 24H, Fishbone


Vital Signs/I&O





Vital Signs








  Date Time  Temp Pulse Resp B/P (MAP) Pulse Ox O2 Delivery O2 Flow Rate FiO2


 


7/19/21 10:00 99.1 97 20 126/80 (95) 90 Nasal Cannula 2.0 














I&O- Last 24 Hours up to 6 AM 


 


 7/19/21





 06:00


 


Intake Total 50 ml


 


Balance 50 ml











Laboratory Data


24H LABS


Laboratory Tests 2


7/18/21 21:44: 


Blood Gas Bicarbonate Standard 23.5, Arterial Blood pH 7.339L, Arterial Blood 

Partial Pressure CO2 48.8H, Arterial Blood Partial Pressure O2 56.1L, Arterial 

Blood Total CO2 27.2, Arterial Blood HCO3 25.7, Arterial Blood Base Excess -0.9,

Arterial Blood Oxygen Saturation 90.1L


7/18/21 22:00: 


Immature Granulocyte % (Auto) 0.5, Neutrophils (%) (Auto) 73.7H, Lymphocytes (%)

(Auto) 14.8L, Monocytes (%) (Auto) 5.4, Eosinophils (%) (Auto) 5.1H, Basophils 

(%) (Auto) 0.5, Neutrophils # (Auto) 10.8H, Lymphocytes # (Auto) 2.2, Monocytes 

# (Auto) 0.8, Eosinophils # (Auto) 0.7H, Basophils # (Auto) 0.1, Nucleated Red 

Blood Cells % (auto) 0.0, Anion Gap 4L, Glomerular Filtration Rate > 60.0, 

Calcium Level 8.9, Total Bilirubin 0.6, Direct Bilirubin 0.2, Aspartate Amino T

ransf (AST/SGOT) 28, Alanine Aminotransferase (ALT/SGPT) 23, Alkaline 

Phosphatase 92, Total Protein 6.8, Albumin 3.9, Albumin/Globulin Ratio 1.3, 

Coronavirus (COVID-19)(PCR) NEGATIVE, Influenza Type A (RT-PCR) NEGATIVE, 

Influenza Type B (RT-PCR) NEGATIVE, Respiratory Syncytial Virus (PCR) NEGATIVE


7/19/21 06:22: 


Lactic Acid Level 2.2*H, NT-Pro-B-Type Natriuretic Peptide 292H


7/19/21 10:52: Lactic Acid Followup at 4 Hours 3.5*H


7/19/21 12:53: Lactic Acid Level 3.2*H


CBC/BMP


Laboratory Tests


7/18/21 22:00











GME ATTESTATION


GME ATTESTATION


My faculty preceptor for this patient encounter was physically present during 

the encounter and was fully available. All aspects of the patient interview, 

examination, medical decision making process, and medical care plan development 

were reviewed and approved by the faculty preceptor. The faculty preceptor is 

aware and concurs with the plan as stated in the body of this note and will 

attest to such by his/her cosignature.





ATTENDING NOTE


I, Bridgette Posey MD, have independently examined this patient and performed my o

wn physical exam, as well as reviewed the documentation and edited where 

necessary. I have discussed in detail with the resident / student the findings 

and plan of treatment as documented by the resident / student and edited their 

note. I agree with their findings and treatment plan and have edited their 

documentation. I will continue to follow the patient during this hospital stay.











AUSTIN MACHADO DO            Jul 19, 2021 14:50


BRIDGETTE POSEY MD             Jul 22, 2021 12:25
Normal gait / station